# Patient Record
Sex: FEMALE | Race: ASIAN | NOT HISPANIC OR LATINO | URBAN - METROPOLITAN AREA
[De-identification: names, ages, dates, MRNs, and addresses within clinical notes are randomized per-mention and may not be internally consistent; named-entity substitution may affect disease eponyms.]

---

## 2017-10-14 ENCOUNTER — EMERGENCY (EMERGENCY)
Facility: HOSPITAL | Age: 82
LOS: 0 days | Discharge: HOME | End: 2017-10-14

## 2017-10-14 DIAGNOSIS — S52.601A UNSPECIFIED FRACTURE OF LOWER END OF RIGHT ULNA, INITIAL ENCOUNTER FOR CLOSED FRACTURE: ICD-10-CM

## 2017-10-14 DIAGNOSIS — S09.90XA UNSPECIFIED INJURY OF HEAD, INITIAL ENCOUNTER: ICD-10-CM

## 2017-10-14 DIAGNOSIS — I10 ESSENTIAL (PRIMARY) HYPERTENSION: ICD-10-CM
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
## 2017-10-14 DIAGNOSIS — E78.00 PURE HYPERCHOLESTEROLEMIA, UNSPECIFIED: ICD-10-CM

## 2023-05-23 ENCOUNTER — INPATIENT (INPATIENT)
Facility: HOSPITAL | Age: 88
LOS: 3 days | Discharge: HOME | DRG: 871 | End: 2023-05-27
Attending: FAMILY MEDICINE | Admitting: FAMILY MEDICINE
Payer: MEDICARE

## 2023-05-23 VITALS
OXYGEN SATURATION: 93 % | SYSTOLIC BLOOD PRESSURE: 157 MMHG | RESPIRATION RATE: 18 BRPM | TEMPERATURE: 101 F | HEART RATE: 106 BPM | DIASTOLIC BLOOD PRESSURE: 88 MMHG

## 2023-05-23 LAB
BASOPHILS # BLD AUTO: 0.01 K/UL — SIGNIFICANT CHANGE UP (ref 0–0.2)
BASOPHILS NFR BLD AUTO: 0.4 % — SIGNIFICANT CHANGE UP (ref 0–1)
EOSINOPHIL # BLD AUTO: 0.01 K/UL — SIGNIFICANT CHANGE UP (ref 0–0.7)
EOSINOPHIL NFR BLD AUTO: 0.4 % — SIGNIFICANT CHANGE UP (ref 0–8)
GAS PNL BLDV: SIGNIFICANT CHANGE UP
HCT VFR BLD CALC: 30.2 % — LOW (ref 37–47)
HGB BLD-MCNC: 9.7 G/DL — LOW (ref 12–16)
IMM GRANULOCYTES NFR BLD AUTO: 0.7 % — HIGH (ref 0.1–0.3)
INR BLD: 0.96 RATIO — SIGNIFICANT CHANGE UP (ref 0.65–1.3)
LYMPHOCYTES # BLD AUTO: 0.21 K/UL — LOW (ref 1.2–3.4)
LYMPHOCYTES # BLD AUTO: 7.7 % — LOW (ref 20.5–51.1)
MCHC RBC-ENTMCNC: 30.7 PG — SIGNIFICANT CHANGE UP (ref 27–31)
MCHC RBC-ENTMCNC: 32.1 G/DL — SIGNIFICANT CHANGE UP (ref 32–37)
MCV RBC AUTO: 95.6 FL — SIGNIFICANT CHANGE UP (ref 81–99)
MONOCYTES # BLD AUTO: 0.04 K/UL — LOW (ref 0.1–0.6)
MONOCYTES NFR BLD AUTO: 1.5 % — LOW (ref 1.7–9.3)
NEUTROPHILS # BLD AUTO: 2.43 K/UL — SIGNIFICANT CHANGE UP (ref 1.4–6.5)
NEUTROPHILS NFR BLD AUTO: 89.3 % — HIGH (ref 42.2–75.2)
NRBC # BLD: 0 /100 WBCS — SIGNIFICANT CHANGE UP (ref 0–0)
PLATELET # BLD AUTO: 104 K/UL — LOW (ref 130–400)
PMV BLD: 11.6 FL — HIGH (ref 7.4–10.4)
PROTHROM AB SERPL-ACNC: 10.9 SEC — SIGNIFICANT CHANGE UP (ref 9.95–12.87)
RBC # BLD: 3.16 M/UL — LOW (ref 4.2–5.4)
RBC # FLD: 13.2 % — SIGNIFICANT CHANGE UP (ref 11.5–14.5)
WBC # BLD: 2.72 K/UL — LOW (ref 4.8–10.8)
WBC # FLD AUTO: 2.72 K/UL — LOW (ref 4.8–10.8)

## 2023-05-23 PROCEDURE — 71045 X-RAY EXAM CHEST 1 VIEW: CPT | Mod: 26

## 2023-05-23 PROCEDURE — 93010 ELECTROCARDIOGRAM REPORT: CPT

## 2023-05-23 PROCEDURE — 99285 EMERGENCY DEPT VISIT HI MDM: CPT

## 2023-05-23 RX ORDER — SODIUM CHLORIDE 9 MG/ML
1000 INJECTION INTRAMUSCULAR; INTRAVENOUS; SUBCUTANEOUS ONCE
Refills: 0 | Status: COMPLETED | OUTPATIENT
Start: 2023-05-23 | End: 2023-05-23

## 2023-05-23 RX ORDER — ACETAMINOPHEN 500 MG
975 TABLET ORAL ONCE
Refills: 0 | Status: COMPLETED | OUTPATIENT
Start: 2023-05-23 | End: 2023-05-23

## 2023-05-23 RX ORDER — CEFTRIAXONE 500 MG/1
1000 INJECTION, POWDER, FOR SOLUTION INTRAMUSCULAR; INTRAVENOUS ONCE
Refills: 0 | Status: COMPLETED | OUTPATIENT
Start: 2023-05-23 | End: 2023-05-23

## 2023-05-23 RX ADMIN — CEFTRIAXONE 100 MILLIGRAM(S): 500 INJECTION, POWDER, FOR SOLUTION INTRAMUSCULAR; INTRAVENOUS at 23:39

## 2023-05-23 RX ADMIN — SODIUM CHLORIDE 1000 MILLILITER(S): 9 INJECTION INTRAMUSCULAR; INTRAVENOUS; SUBCUTANEOUS at 23:40

## 2023-05-23 RX ADMIN — Medication 975 MILLIGRAM(S): at 23:38

## 2023-05-24 DIAGNOSIS — Z96.651 PRESENCE OF RIGHT ARTIFICIAL KNEE JOINT: Chronic | ICD-10-CM

## 2023-05-24 DIAGNOSIS — Z90.49 ACQUIRED ABSENCE OF OTHER SPECIFIED PARTS OF DIGESTIVE TRACT: Chronic | ICD-10-CM

## 2023-05-24 DIAGNOSIS — A41.9 SEPSIS, UNSPECIFIED ORGANISM: ICD-10-CM

## 2023-05-24 LAB
ALBUMIN SERPL ELPH-MCNC: 2.8 G/DL — LOW (ref 3.5–5.2)
ALBUMIN SERPL ELPH-MCNC: 3.5 G/DL — SIGNIFICANT CHANGE UP (ref 3.5–5.2)
ALP SERPL-CCNC: 57 U/L — SIGNIFICANT CHANGE UP (ref 30–115)
ALP SERPL-CCNC: 76 U/L — SIGNIFICANT CHANGE UP (ref 30–115)
ALT FLD-CCNC: 17 U/L — SIGNIFICANT CHANGE UP (ref 0–41)
ALT FLD-CCNC: 22 U/L — SIGNIFICANT CHANGE UP (ref 0–41)
ANION GAP SERPL CALC-SCNC: 11 MMOL/L — SIGNIFICANT CHANGE UP (ref 7–14)
ANION GAP SERPL CALC-SCNC: 14 MMOL/L — SIGNIFICANT CHANGE UP (ref 7–14)
APPEARANCE UR: CLEAR — SIGNIFICANT CHANGE UP
APTT BLD: 23.2 SEC — CRITICAL LOW (ref 27–39.2)
AST SERPL-CCNC: 26 U/L — SIGNIFICANT CHANGE UP (ref 0–41)
AST SERPL-CCNC: 37 U/L — SIGNIFICANT CHANGE UP (ref 0–41)
BACTERIA # UR AUTO: SIGNIFICANT CHANGE UP /HPF
BASE EXCESS BLDV CALC-SCNC: 0.1 MMOL/L — SIGNIFICANT CHANGE UP (ref -2–3)
BASOPHILS # BLD AUTO: 0.03 K/UL — SIGNIFICANT CHANGE UP (ref 0–0.2)
BASOPHILS NFR BLD AUTO: 0.3 % — SIGNIFICANT CHANGE UP (ref 0–1)
BILIRUB SERPL-MCNC: 0.5 MG/DL — SIGNIFICANT CHANGE UP (ref 0.2–1.2)
BILIRUB SERPL-MCNC: 0.8 MG/DL — SIGNIFICANT CHANGE UP (ref 0.2–1.2)
BILIRUB UR-MCNC: NEGATIVE — SIGNIFICANT CHANGE UP
BLD GP AB SCN SERPL QL: SIGNIFICANT CHANGE UP
BUN SERPL-MCNC: 38 MG/DL — HIGH (ref 10–20)
BUN SERPL-MCNC: 40 MG/DL — HIGH (ref 10–20)
CA-I SERPL-SCNC: 1.04 MMOL/L — LOW (ref 1.15–1.33)
CALCIUM SERPL-MCNC: 7.1 MG/DL — LOW (ref 8.4–10.5)
CALCIUM SERPL-MCNC: 8 MG/DL — LOW (ref 8.4–10.5)
CHLORIDE SERPL-SCNC: 105 MMOL/L — SIGNIFICANT CHANGE UP (ref 98–110)
CHLORIDE SERPL-SCNC: 109 MMOL/L — SIGNIFICANT CHANGE UP (ref 98–110)
CO2 SERPL-SCNC: 22 MMOL/L — SIGNIFICANT CHANGE UP (ref 17–32)
CO2 SERPL-SCNC: 22 MMOL/L — SIGNIFICANT CHANGE UP (ref 17–32)
COLOR SPEC: YELLOW — SIGNIFICANT CHANGE UP
CREAT SERPL-MCNC: 2.2 MG/DL — HIGH (ref 0.7–1.5)
CREAT SERPL-MCNC: 2.3 MG/DL — HIGH (ref 0.7–1.5)
DIFF PNL FLD: ABNORMAL
EGFR: 20 ML/MIN/1.73M2 — LOW
EGFR: 21 ML/MIN/1.73M2 — LOW
EOSINOPHIL # BLD AUTO: 0.05 K/UL — SIGNIFICANT CHANGE UP (ref 0–0.7)
EOSINOPHIL NFR BLD AUTO: 0.5 % — SIGNIFICANT CHANGE UP (ref 0–8)
EPI CELLS # UR: ABNORMAL /HPF (ref 0–5)
FLUAV AG NPH QL: SIGNIFICANT CHANGE UP
FLUBV AG NPH QL: SIGNIFICANT CHANGE UP
GAS PNL BLDV: 133 MMOL/L — LOW (ref 136–145)
GAS PNL BLDV: SIGNIFICANT CHANGE UP
GLUCOSE SERPL-MCNC: 125 MG/DL — HIGH (ref 70–99)
GLUCOSE SERPL-MCNC: 128 MG/DL — HIGH (ref 70–99)
GLUCOSE UR QL: NEGATIVE MG/DL — SIGNIFICANT CHANGE UP
GRAM STN FLD: SIGNIFICANT CHANGE UP
GRAM STN FLD: SIGNIFICANT CHANGE UP
HCO3 BLDV-SCNC: 24 MMOL/L — SIGNIFICANT CHANGE UP (ref 22–29)
HCT VFR BLD CALC: 30 % — LOW (ref 37–47)
HCT VFR BLDA CALC: 52 % — HIGH (ref 39–51)
HGB BLD CALC-MCNC: 17.4 G/DL — SIGNIFICANT CHANGE UP (ref 12.6–17.4)
HGB BLD-MCNC: 9.1 G/DL — LOW (ref 12–16)
IMM GRANULOCYTES NFR BLD AUTO: 0.6 % — HIGH (ref 0.1–0.3)
IRON SATN MFR SERPL: 10 UG/DL — LOW (ref 35–150)
IRON SATN MFR SERPL: 6 % — LOW (ref 15–50)
KETONES UR-MCNC: NEGATIVE — SIGNIFICANT CHANGE UP
LACTATE BLDV-MCNC: 1.5 MMOL/L — SIGNIFICANT CHANGE UP (ref 0.5–2)
LACTATE SERPL-SCNC: 1.7 MMOL/L — SIGNIFICANT CHANGE UP (ref 0.7–2)
LEUKOCYTE ESTERASE UR-ACNC: ABNORMAL
LYMPHOCYTES # BLD AUTO: 0.71 K/UL — LOW (ref 1.2–3.4)
LYMPHOCYTES # BLD AUTO: 7.4 % — LOW (ref 20.5–51.1)
MAGNESIUM SERPL-MCNC: 1.5 MG/DL — LOW (ref 1.8–2.4)
MCHC RBC-ENTMCNC: 30.3 G/DL — LOW (ref 32–37)
MCHC RBC-ENTMCNC: 30.5 PG — SIGNIFICANT CHANGE UP (ref 27–31)
MCV RBC AUTO: 100.7 FL — HIGH (ref 81–99)
MONOCYTES # BLD AUTO: 0.53 K/UL — SIGNIFICANT CHANGE UP (ref 0.1–0.6)
MONOCYTES NFR BLD AUTO: 5.5 % — SIGNIFICANT CHANGE UP (ref 1.7–9.3)
NEUTROPHILS # BLD AUTO: 8.21 K/UL — HIGH (ref 1.4–6.5)
NEUTROPHILS NFR BLD AUTO: 85.7 % — HIGH (ref 42.2–75.2)
NITRITE UR-MCNC: NEGATIVE — SIGNIFICANT CHANGE UP
NRBC # BLD: 0 /100 WBCS — SIGNIFICANT CHANGE UP (ref 0–0)
PCO2 BLDV: 36 MMHG — LOW (ref 39–42)
PH BLDV: 7.43 — SIGNIFICANT CHANGE UP (ref 7.32–7.43)
PH UR: 6.5 — SIGNIFICANT CHANGE UP (ref 5–8)
PHOSPHATE SERPL-MCNC: 2.3 MG/DL — SIGNIFICANT CHANGE UP (ref 2.1–4.9)
PLATELET # BLD AUTO: 96 K/UL — LOW (ref 130–400)
PMV BLD: 10.8 FL — HIGH (ref 7.4–10.4)
PO2 BLDV: 53 MMHG — SIGNIFICANT CHANGE UP
POTASSIUM BLDV-SCNC: 3.5 MMOL/L — SIGNIFICANT CHANGE UP (ref 3.5–5.1)
POTASSIUM SERPL-MCNC: 3.6 MMOL/L — SIGNIFICANT CHANGE UP (ref 3.5–5)
POTASSIUM SERPL-MCNC: 3.9 MMOL/L — SIGNIFICANT CHANGE UP (ref 3.5–5)
POTASSIUM SERPL-SCNC: 3.6 MMOL/L — SIGNIFICANT CHANGE UP (ref 3.5–5)
POTASSIUM SERPL-SCNC: 3.9 MMOL/L — SIGNIFICANT CHANGE UP (ref 3.5–5)
PROCALCITONIN SERPL-MCNC: 19 NG/ML — HIGH (ref 0.02–0.1)
PROT SERPL-MCNC: 4.9 G/DL — LOW (ref 6–8)
PROT SERPL-MCNC: 5.7 G/DL — LOW (ref 6–8)
PROT UR-MCNC: >=300 MG/DL
RBC # BLD: 2.98 M/UL — LOW (ref 4.2–5.4)
RBC # FLD: 13.4 % — SIGNIFICANT CHANGE UP (ref 11.5–14.5)
RBC CASTS # UR COMP ASSIST: SIGNIFICANT CHANGE UP /HPF (ref 0–4)
RSV RNA NPH QL NAA+NON-PROBE: SIGNIFICANT CHANGE UP
SAO2 % BLDV: 89.2 % — SIGNIFICANT CHANGE UP
SARS-COV-2 RNA SPEC QL NAA+PROBE: SIGNIFICANT CHANGE UP
SODIUM SERPL-SCNC: 141 MMOL/L — SIGNIFICANT CHANGE UP (ref 135–146)
SODIUM SERPL-SCNC: 142 MMOL/L — SIGNIFICANT CHANGE UP (ref 135–146)
SP GR SPEC: 1.02 — SIGNIFICANT CHANGE UP (ref 1.01–1.03)
SPECIMEN SOURCE: SIGNIFICANT CHANGE UP
SPECIMEN SOURCE: SIGNIFICANT CHANGE UP
TIBC SERPL-MCNC: 156 UG/DL — LOW (ref 220–430)
TRANSFERRIN SERPL-MCNC: 139 MG/DL — LOW (ref 200–360)
TROPONIN T SERPL-MCNC: 0.09 NG/ML — CRITICAL HIGH
TROPONIN T SERPL-MCNC: 0.11 NG/ML — CRITICAL HIGH
UIBC SERPL-MCNC: 146 UG/DL — SIGNIFICANT CHANGE UP (ref 110–370)
UROBILINOGEN FLD QL: 0.2 MG/DL — SIGNIFICANT CHANGE UP
WBC # BLD: 9.59 K/UL — SIGNIFICANT CHANGE UP (ref 4.8–10.8)
WBC # FLD AUTO: 9.59 K/UL — SIGNIFICANT CHANGE UP (ref 4.8–10.8)
WBC UR QL: >50 /HPF (ref 0–5)

## 2023-05-24 PROCEDURE — 86901 BLOOD TYPING SEROLOGIC RH(D): CPT

## 2023-05-24 PROCEDURE — 86850 RBC ANTIBODY SCREEN: CPT

## 2023-05-24 PROCEDURE — 99233 SBSQ HOSP IP/OBS HIGH 50: CPT

## 2023-05-24 PROCEDURE — 85045 AUTOMATED RETICULOCYTE COUNT: CPT

## 2023-05-24 PROCEDURE — 83550 IRON BINDING TEST: CPT

## 2023-05-24 PROCEDURE — 84100 ASSAY OF PHOSPHORUS: CPT

## 2023-05-24 PROCEDURE — 97116 GAIT TRAINING THERAPY: CPT | Mod: GP

## 2023-05-24 PROCEDURE — 85025 COMPLETE CBC W/AUTO DIFF WBC: CPT

## 2023-05-24 PROCEDURE — 80048 BASIC METABOLIC PNL TOTAL CA: CPT

## 2023-05-24 PROCEDURE — 97162 PT EVAL MOD COMPLEX 30 MIN: CPT | Mod: GP

## 2023-05-24 PROCEDURE — 82746 ASSAY OF FOLIC ACID SERUM: CPT

## 2023-05-24 PROCEDURE — 82553 CREATINE MB FRACTION: CPT

## 2023-05-24 PROCEDURE — 99222 1ST HOSP IP/OBS MODERATE 55: CPT

## 2023-05-24 PROCEDURE — 80053 COMPREHEN METABOLIC PANEL: CPT

## 2023-05-24 PROCEDURE — 83935 ASSAY OF URINE OSMOLALITY: CPT

## 2023-05-24 PROCEDURE — 74176 CT ABD & PELVIS W/O CONTRAST: CPT | Mod: 26,MA

## 2023-05-24 PROCEDURE — 93306 TTE W/DOPPLER COMPLETE: CPT | Mod: 26

## 2023-05-24 PROCEDURE — 84540 ASSAY OF URINE/UREA-N: CPT

## 2023-05-24 PROCEDURE — 36415 COLL VENOUS BLD VENIPUNCTURE: CPT

## 2023-05-24 PROCEDURE — 83735 ASSAY OF MAGNESIUM: CPT

## 2023-05-24 PROCEDURE — 84145 PROCALCITONIN (PCT): CPT

## 2023-05-24 PROCEDURE — 82607 VITAMIN B-12: CPT

## 2023-05-24 PROCEDURE — 84484 ASSAY OF TROPONIN QUANT: CPT

## 2023-05-24 PROCEDURE — 85027 COMPLETE CBC AUTOMATED: CPT

## 2023-05-24 PROCEDURE — 93970 EXTREMITY STUDY: CPT

## 2023-05-24 PROCEDURE — 82570 ASSAY OF URINE CREATININE: CPT

## 2023-05-24 PROCEDURE — 87040 BLOOD CULTURE FOR BACTERIA: CPT

## 2023-05-24 PROCEDURE — 93306 TTE W/DOPPLER COMPLETE: CPT

## 2023-05-24 PROCEDURE — 86900 BLOOD TYPING SEROLOGIC ABO: CPT

## 2023-05-24 PROCEDURE — 82728 ASSAY OF FERRITIN: CPT

## 2023-05-24 PROCEDURE — 93005 ELECTROCARDIOGRAM TRACING: CPT

## 2023-05-24 PROCEDURE — 93010 ELECTROCARDIOGRAM REPORT: CPT

## 2023-05-24 PROCEDURE — 83036 HEMOGLOBIN GLYCOSYLATED A1C: CPT

## 2023-05-24 PROCEDURE — 84466 ASSAY OF TRANSFERRIN: CPT

## 2023-05-24 PROCEDURE — 83540 ASSAY OF IRON: CPT

## 2023-05-24 PROCEDURE — 84300 ASSAY OF URINE SODIUM: CPT

## 2023-05-24 PROCEDURE — 97110 THERAPEUTIC EXERCISES: CPT | Mod: GP

## 2023-05-24 RX ORDER — HEPARIN SODIUM 5000 [USP'U]/ML
7500 INJECTION INTRAVENOUS; SUBCUTANEOUS EVERY 8 HOURS
Refills: 0 | Status: DISCONTINUED | OUTPATIENT
Start: 2023-05-24 | End: 2023-05-24

## 2023-05-24 RX ORDER — SIMVASTATIN 20 MG/1
20 TABLET, FILM COATED ORAL AT BEDTIME
Refills: 0 | Status: DISCONTINUED | OUTPATIENT
Start: 2023-05-24 | End: 2023-05-27

## 2023-05-24 RX ORDER — ACETAMINOPHEN 500 MG
975 TABLET ORAL EVERY 6 HOURS
Refills: 0 | Status: DISCONTINUED | OUTPATIENT
Start: 2023-05-24 | End: 2023-05-27

## 2023-05-24 RX ORDER — HEPARIN SODIUM 5000 [USP'U]/ML
5000 INJECTION INTRAVENOUS; SUBCUTANEOUS EVERY 8 HOURS
Refills: 0 | Status: DISCONTINUED | OUTPATIENT
Start: 2023-05-24 | End: 2023-05-27

## 2023-05-24 RX ORDER — AMLODIPINE BESYLATE 2.5 MG/1
1 TABLET ORAL
Refills: 0 | DISCHARGE

## 2023-05-24 RX ORDER — TRAZODONE HCL 50 MG
1 TABLET ORAL
Refills: 0 | DISCHARGE

## 2023-05-24 RX ORDER — CEFTRIAXONE 500 MG/1
1000 INJECTION, POWDER, FOR SOLUTION INTRAMUSCULAR; INTRAVENOUS EVERY 24 HOURS
Refills: 0 | Status: DISCONTINUED | OUTPATIENT
Start: 2023-05-24 | End: 2023-05-25

## 2023-05-24 RX ORDER — MAGNESIUM SULFATE 500 MG/ML
2 VIAL (ML) INJECTION ONCE
Refills: 0 | Status: COMPLETED | OUTPATIENT
Start: 2023-05-24 | End: 2023-05-24

## 2023-05-24 RX ORDER — LOSARTAN/HYDROCHLOROTHIAZIDE 100MG-25MG
1 TABLET ORAL
Refills: 0 | DISCHARGE

## 2023-05-24 RX ORDER — MEMANTINE HYDROCHLORIDE 10 MG/1
1 TABLET ORAL
Refills: 0 | DISCHARGE

## 2023-05-24 RX ORDER — SODIUM CHLORIDE 9 MG/ML
1000 INJECTION INTRAMUSCULAR; INTRAVENOUS; SUBCUTANEOUS ONCE
Refills: 0 | Status: COMPLETED | OUTPATIENT
Start: 2023-05-24 | End: 2023-05-24

## 2023-05-24 RX ORDER — SIMVASTATIN 20 MG/1
1 TABLET, FILM COATED ORAL
Refills: 0 | DISCHARGE

## 2023-05-24 RX ORDER — SODIUM CHLORIDE 9 MG/ML
1000 INJECTION INTRAMUSCULAR; INTRAVENOUS; SUBCUTANEOUS
Refills: 0 | Status: DISCONTINUED | OUTPATIENT
Start: 2023-05-24 | End: 2023-05-25

## 2023-05-24 RX ORDER — CHLORHEXIDINE GLUCONATE 213 G/1000ML
1 SOLUTION TOPICAL
Refills: 0 | Status: DISCONTINUED | OUTPATIENT
Start: 2023-05-24 | End: 2023-05-27

## 2023-05-24 RX ORDER — PANTOPRAZOLE SODIUM 20 MG/1
40 TABLET, DELAYED RELEASE ORAL
Refills: 0 | Status: DISCONTINUED | OUTPATIENT
Start: 2023-05-24 | End: 2023-05-27

## 2023-05-24 RX ADMIN — PANTOPRAZOLE SODIUM 40 MILLIGRAM(S): 20 TABLET, DELAYED RELEASE ORAL at 06:03

## 2023-05-24 RX ADMIN — Medication 25 GRAM(S): at 12:24

## 2023-05-24 RX ADMIN — SIMVASTATIN 20 MILLIGRAM(S): 20 TABLET, FILM COATED ORAL at 21:09

## 2023-05-24 RX ADMIN — Medication 975 MILLIGRAM(S): at 13:20

## 2023-05-24 RX ADMIN — Medication 25 GRAM(S): at 08:50

## 2023-05-24 RX ADMIN — SODIUM CHLORIDE 1000 MILLILITER(S): 9 INJECTION INTRAMUSCULAR; INTRAVENOUS; SUBCUTANEOUS at 05:30

## 2023-05-24 RX ADMIN — HEPARIN SODIUM 7500 UNIT(S): 5000 INJECTION INTRAVENOUS; SUBCUTANEOUS at 05:27

## 2023-05-24 RX ADMIN — HEPARIN SODIUM 5000 UNIT(S): 5000 INJECTION INTRAVENOUS; SUBCUTANEOUS at 21:09

## 2023-05-24 RX ADMIN — SODIUM CHLORIDE 100 MILLILITER(S): 9 INJECTION INTRAMUSCULAR; INTRAVENOUS; SUBCUTANEOUS at 05:30

## 2023-05-24 RX ADMIN — HEPARIN SODIUM 5000 UNIT(S): 5000 INJECTION INTRAVENOUS; SUBCUTANEOUS at 16:07

## 2023-05-24 RX ADMIN — SODIUM CHLORIDE 1000 MILLILITER(S): 9 INJECTION INTRAMUSCULAR; INTRAVENOUS; SUBCUTANEOUS at 03:12

## 2023-05-24 RX ADMIN — CHLORHEXIDINE GLUCONATE 1 APPLICATION(S): 213 SOLUTION TOPICAL at 05:31

## 2023-05-24 RX ADMIN — CEFTRIAXONE 100 MILLIGRAM(S): 500 INJECTION, POWDER, FOR SOLUTION INTRAMUSCULAR; INTRAVENOUS at 21:09

## 2023-05-24 RX ADMIN — Medication 975 MILLIGRAM(S): at 12:41

## 2023-05-24 NOTE — H&P ADULT - NSHPPHYSICALEXAM_GEN_ALL_CORE
GENERAL:  89y/o   Female NAD, resting comfortably. pt sleepy , arousable speaks Wolof  HEAD:  Atraumatic, Normocephalic  EYES: EOMI, PERRLA, conjunctiva and sclera clear  NECK: Supple, No JVD, no cervical lymphadenopathy, non-tender  CHEST/LUNG: Clear to auscultation bilaterally; No wheeze, rhonchi, or rales  HEART: Regular rate and rhythm; S1&S2  ABDOMEN: Soft, Nontender, Nondistended x 4 quadrants; Bowel sounds present  EXTREMITIES:   Peripheral Pulses Present, No clubbing, no cyanosis, or no edema, no calf tenderness  PSYCH: AAOx3, cooperative, appropriate  NEUROLOGY: WNL  SKIN: WNL

## 2023-05-24 NOTE — ED PROVIDER NOTE - PATIENT/CAREGIVER OFFERED  INTERPRETER SERVICES
Pt. discharged home with wife. Pt. verbalized understanding of all f/u visits and new medications. IV removed. Pt. issued a walker by PT.    yes

## 2023-05-24 NOTE — CONSULT NOTE ADULT - ASSESSMENT
IMPRESSION:    Septic on admission  UTI   Colon cancer s/p resection - on oral chemo  Pancytopenia  KJ  vs CKD    PLAN:    CNS: Avoid sedation     HEENT: Oral care    PULMONARY:  HOB @ 45 degrees.  Aspiration precautions. on room air.      CARDIOVASCULAR: DC IVF. CHEETAH if become hypotensive. Avoid volume overload. Strict I's and O's. ECHO. Trend CE. MAP>65.     GI: PO Feeding. Bowel regimen     RENAL: Follow up lytes. Correct as needed. No hydronephrosis. Urine lytes.   Nephro follow up    INFECTIOUS DISEASE: Follow up blood and urine cultures. C/w Rocephin for UTI    HEMATOLOGICAL:  DVT prophylaxis. Follow up oncologist for pancytopenia, iron studies.    ENDOCRINE:  Follow up FS.  Insulin protocol if needed.    MUSCULOSKELETAL: IAT. PT / rehab    SDU     Trops, Echo, f/u Cx, Kj and pancytopenia.

## 2023-05-24 NOTE — CONSULT NOTE ADULT - SUBJECTIVE AND OBJECTIVE BOX
Patient is a 88y old  Female who presents with a chief complaint of sepsis (24 May 2023 02:50)      HPI:  87yo  female w/ PMH as noted below.  Pt comes from home c/o progressive fever, malsise.  In ER pt was found to have fever 103F hypotensive (SBP 85).  Pt was given 2 liters NS IVF w/o much improvement.  daughter denied: nausea, vomiting ,diarrhea.  W/U in ER did not show any obvious source for, pneumonia  some WBC in urine . possible early  UTI.  Per daughter pt has no advanced directives & remains full code. w/u (24 May 2023 02:50)      PAST MEDICAL & SURGICAL HISTORY:  Colon cancer      HTN (hypertension)      Dyslipidemia      S/P colon resection      Status post right knee replacement      SOCIAL HX:   Smoking                         ETOH                            Other    FAMILY HISTORY:  :  No known cardiovacular family hisotry     Review Of Systems:     All ROS are negative except per HPI       Allergies    No Known Allergies    Intolerances          PHYSICAL EXAM    ICU Vital Signs Last 24 Hrs  T(C): 35.3 (24 May 2023 07:05), Max: 40.3 (23 May 2023 23:36)  T(F): 95.6 (24 May 2023 07:05), Max: 104.5 (23 May 2023 23:36)  HR: 56 (24 May 2023 07:30) (56 - 106)  BP: 107/55 (24 May 2023 07:00) (86/50 - 157/88)  BP(mean): 77 (24 May 2023 07:00) (66 - 77)  ABP: --  ABP(mean): --  RR: 19 (24 May 2023 07:30) (18 - 25)  SpO2: 96% (24 May 2023 07:30) (93% - 99%)    O2 Parameters below as of 24 May 2023 07:30  Patient On (Oxygen Delivery Method): room air              ENT: Airway patent,  EYES: Pupils equal, Round and reactive to light.  CARDIAC: Normal rate, Regular rhythm.    RESPIRATORY: No wheezing, Bilateral BS, Not tachypneic, No use of accessory muscles  GASTROINTESTINAL: Abdomen soft, Non-tender, No guarding,   NEUROLOGICAL: Alert and oriented   SKIN: Skin normal color for race, Warm and dry and intact.       23 @ 07:01  -  23 @ 07:00  --------------------------------------------------------  IN:    sodium chloride 0.9%: 400 mL    Sodium Chloride 0.9% Bolus: 2000 mL  Total IN: 2400 mL    OUT:  Total OUT: 0 mL    Total NET: 2400 mL          LABS:                          9.7    2.72  )-----------( 104      ( 23 May 2023 23:35 )             30.2                                               05-24    142  |  109  |  38<H>  ----------------------------<  125<H>  3.6   |  22  |  2.3<H>    Ca    7.1<L>      24 May 2023 05:24  Phos  2.3     05-  Mg     1.5         TPro  4.9<L>  /  Alb  2.8<L>  /  TBili  0.5  /  DBili  x   /  AST  26  /  ALT  17  /  AlkPhos  57  05-24      PT/INR - ( 23 May 2023 23:35 )   PT: 10.90 sec;   INR: 0.96 ratio         PTT - ( 23 May 2023 23:35 )  PTT:23.2 sec                                       Urinalysis Basic - ( 24 May 2023 00:00 )    Color: Yellow / Appearance: Clear / S.020 / pH: x  Gluc: x / Ketone: Negative  / Bili: Negative / Urobili: 0.2 mg/dL   Blood: x / Protein: >=300 mg/dL / Nitrite: Negative   Leuk Esterase: Trace / RBC: 1-2 /HPF / WBC >50 /HPF   Sq Epi: x / Non Sq Epi: x / Bacteria: Occasional /HPF        CARDIAC MARKERS ( 23 May 2023 23:35 )  x     / 0.09 ng/mL / x     / x     / x                                                LIVER FUNCTIONS - ( 24 May 2023 05:24 )  Alb: 2.8 g/dL / Pro: 4.9 g/dL / ALK PHOS: 57 U/L / ALT: 17 U/L / AST: 26 U/L / GGT: x                                                                                                                                         MEDICATIONS  (STANDING):  chlorhexidine 2% Cloths 1 Application(s) Topical <User Schedule>  heparin   Injectable 5000 Unit(s) SubCutaneous every 8 hours  magnesium sulfate  IVPB 2 Gram(s) IV Intermittent once  magnesium sulfate  IVPB 2 Gram(s) IV Intermittent once  pantoprazole    Tablet 40 milliGRAM(s) Oral before breakfast  sodium chloride 0.9%. 1000 milliLiter(s) (100 mL/Hr) IV Continuous <Continuous>    MEDICATIONS  (PRN):

## 2023-05-24 NOTE — ED PROVIDER NOTE - OBJECTIVE STATEMENT
88 year old female past medical history of Hypertension, HLD and colon cancer with resection and on oral chemotherapy comes to emergency room for body aches with fever chills, back/abdominal pain and leg pain. patient also having some coughing at shortness of breath.

## 2023-05-24 NOTE — CONSULT NOTE ADULT - SUBJECTIVE AND OBJECTIVE BOX
Patient is a 88y old  Female who presents with a chief complaint of sepsis (24 May 2023 09:04)        REVIEW OF SYSTEMS: Total of twelve systems have been reviewed \ and found to be negative unless mentioned in HPI        PAST MEDICAL & SURGICAL HISTORY:  Colon cancer  HTN (hypertension)  Dyslipidemia  S/P colon resection  Status post right knee replacement        SOCIAL HISTORY  Alcohol: Does not drink  Tobacco: Does not smoke  Illicit substance use: None      FAMILY HISTORY: Non contributory to the present illness        ALLERGIES: No Known Allergies      ICU Vital Signs Last 24 Hrs  T(C): 36.4 (24 May 2023 11:00), Max: 40.3 (23 May 2023 23:36)  T(F): 97.6 (24 May 2023 11:00), Max: 104.5 (23 May 2023 23:36)  HR: 56 (24 May 2023 07:30) (56 - 106)  BP: 107/55 (24 May 2023 07:00) (86/50 - 157/88)  BP(mean): 77 (24 May 2023 07:00) (66 - 77)  ABP: --  ABP(mean): --  RR: 18 (24 May 2023 13:00) (18 - 26)  SpO2: 96% (24 May 2023 07:30) (93% - 99%)    O2 Parameters below as of 24 May 2023 13:00  Patient On (Oxygen Delivery Method): room air        PHYSICAL EXAM:  GENERAL: Not in distress   CHEST/LUNG:  Not using accessory msucles  HEART: s1 and s2 present  ABDOMEN:  Nontender and  Nondistended  EXTREMITIES: No pedal  edema  CNS: Awake and Alert      LABS:                        9.1    9.59  )-----------( 96       ( 24 May 2023 11:30 )             30.0       05-24    142  |  109  |  38<H>  ----------------------------<  125<H>  3.6   |  22  |  2.3<H>    Ca    7.1<L>      24 May 2023 05:24  Phos  2.3     05-24  Mg     1.5     05-24    TPro  4.9<L>  /  Alb  2.8<L>  /  TBili  0.5  /  DBili  x   /  AST  26  /  ALT  17  /  AlkPhos  57  05-24    PT/INR - ( 23 May 2023 23:35 )   PT: 10.90 sec;   INR: 0.96 ratio      PTT - ( 23 May 2023 23:35 )  PTT:23.2 sec      Urinalysis Basic - ( 24 May 2023 00:00 )\  Color: Yellow / Appearance: Clear / S.020 / pH: x  Gluc: x / Ketone: Negative  / Bili: Negative / Urobili: 0.2 mg/dL   Blood: x / Protein: >=300 mg/dL / Nitrite: Negative   Leuk Esterase: Trace / RBC: 1-2 /HPF / WBC >50 /HPF   Sq Epi: x / Non Sq Epi: x / Bacteria: Occasional /HPF        MEDICATIONS  (STANDING):  cefTRIAXone   IVPB 1000 milliGRAM(s) IV Intermittent every 24 hours  chlorhexidine 2% Cloths 1 Application(s) Topical <User Schedule>  heparin   Injectable 5000 Unit(s) SubCutaneous every 8 hours  pantoprazole    Tablet 40 milliGRAM(s) Oral before breakfast  sodium chloride 0.9%. 1000 milliLiter(s) (100 mL/Hr) IV Continuous <Continuous>    MEDICATIONS  (PRN):  acetaminophen     Tablet .. 975 milliGRAM(s) Oral every 6 hours PRN Mild Pain (1 - 3)        RADIOLOGY & ADDITIONAL TESTS:    23: CT Abdomen and Pelvis No Cont (23 @ 01:20) No evidence of acute abdominal pathology.      23: Xray Chest 1 View-PORTABLE IMMEDIATE (23 @ 23:16) No radiographic evidence of acute cardiopulmonary disease.        MICROBIOLOGY DATA:    Flu With COVID-19 By BIBI (23 @ 23:35)   SARS-CoV-2 Result: NotDetec  Influenza A Result: NotDete  Influenza B Result: NotDetec  Resp Syn Virus Result: NotDetec             Patient is a 88y old  Female with PMH of Colon cancer, S/P colon resection, HTN, HLD, Status post right knee replacement, now presents to the ER for evaluation of fever, and malaise. On admission, she found to have fever, tachycardia, Hypotension, BP of 86/50  and positive Urine analysis.  She has given 2 liters NS IVF w/o much improvement.  She has admitted in ICU, started on Ceftriaxone and the ID consult requested to assist with further evaluation and antibiotic management.      REVIEW OF SYSTEMS: Total of twelve systems have been reviewed and found to be negative unless mentioned in HPI        PAST MEDICAL & SURGICAL HISTORY:  Colon cancer  HTN (hypertension)  Dyslipidemia  S/P colon resection  Status post right knee replacement        SOCIAL HISTORY  Alcohol: Does not drink  Tobacco: Does not smoke  Illicit substance use: None      FAMILY HISTORY: Non contributory to the present illness      ALLERGIES: No Known Allergies      ICU Vital Signs Last 24 Hrs  T(C): 36.4 (24 May 2023 11:00), Max: 40.3 (23 May 2023 23:36)  T(F): 97.6 (24 May 2023 11:00), Max: 104.5 (23 May 2023 23:36)  HR: 56 (24 May 2023 07:30) (56 - 106)  BP: 107/55 (24 May 2023 07:00) (86/50 - 157/88)  BP(mean): 77 (24 May 2023 07:00) (66 - 77)  ABP: --  ABP(mean): --  RR: 18 (24 May 2023 13:00) (18 - 26)  SpO2: 96% (24 May 2023 07:30) (93% - 99%)    O2 Parameters below as of 24 May 2023 13:00  Patient On (Oxygen Delivery Method): room air        PHYSICAL EXAM:  GENERAL: Not in distress   CHEST/LUNG:  Not using accessory msucles  HEART: s1 and s2 present  ABDOMEN:  Nontender and  Nondistended  EXTREMITIES: No pedal  edema  CNS: Awake and Alert      LABS:                        9.1    9.59  )-----------( 96       ( 24 May 2023 11:30 )             30.0       05-    142  |  109  |  38<H>  ----------------------------<  125<H>  3.6   |  22  |  2.3<H>    Ca    7.1<L>      24 May 2023 05:24  Phos  2.3     05-24  Mg     1.5     05-24    TPro  4.9<L>  /  Alb  2.8<L>  /  TBili  0.5  /  DBili  x   /  AST  26  /  ALT  17  /  AlkPhos  57  05-24    PT/INR - ( 23 May 2023 23:35 )   PT: 10.90 sec;   INR: 0.96 ratio      PTT - ( 23 May 2023 23:35 )  PTT:23.2 sec      Urinalysis Basic - ( 24 May 2023 00:00 )\  Color: Yellow / Appearance: Clear / S.020 / pH: x  Gluc: x / Ketone: Negative  / Bili: Negative / Urobili: 0.2 mg/dL   Blood: x / Protein: >=300 mg/dL / Nitrite: Negative   Leuk Esterase: Trace / RBC: 1-2 /HPF / WBC >50 /HPF   Sq Epi: x / Non Sq Epi: x / Bacteria: Occasional /HPF        MEDICATIONS  (STANDING):  cefTRIAXone   IVPB 1000 milliGRAM(s) IV Intermittent every 24 hours  chlorhexidine 2% Cloths 1 Application(s) Topical <User Schedule>  heparin   Injectable 5000 Unit(s) SubCutaneous every 8 hours  pantoprazole    Tablet 40 milliGRAM(s) Oral before breakfast  sodium chloride 0.9%. 1000 milliLiter(s) (100 mL/Hr) IV Continuous <Continuous>    MEDICATIONS  (PRN):  acetaminophen     Tablet .. 975 milliGRAM(s) Oral every 6 hours PRN Mild Pain (1 - 3)        RADIOLOGY & ADDITIONAL TESTS:    23: CT Abdomen and Pelvis No Cont (23 @ 01:20) No evidence of acute abdominal pathology.      23: Xray Chest 1 View-PORTABLE IMMEDIATE (23 @ 23:16) No radiographic evidence of acute cardiopulmonary disease.        MICROBIOLOGY DATA:    Flu With COVID-19 By BIBI (23 @ 23:35)   SARS-CoV-2 Result: NotDetec  Influenza A Result: NotDetec  Influenza B Result: NotDetec  Resp Syn Virus Result: NotDetec

## 2023-05-24 NOTE — H&P ADULT - HISTORY OF PRESENT ILLNESS
87yo  female w/ PMH as noted below.  Pt comes from home c/o progressive fever, malsise.  In ER pt was found to have fever 103F hypotensive (SBP 85).  Pt was given 2 liters NS IVF w/o much improvement.  daughter denied: nausea, vomiting ,diarrhea.  W/U in ER did not show any obvious source for, pneumonia  some WBC in urine . possible early  UTI.  Per daughter pt has no advanced directives & remains full code. w/u

## 2023-05-24 NOTE — PATIENT PROFILE ADULT - FALL HARM RISK - DEVICES
Subjective   Patient seen and examined. Reports having urinary urgency, frequency, dysuria, and hematuria after stent placement. Otherwise voiding without difficulty, denies abdominal or flank pain, and no other  complaints.    REVIEW OF SYSTEMS:     Review of Systems   Constitutional: Negative for fever.   Gastrointestinal: Negative for abdominal pain and nausea.   Genitourinary: Positive for dysuria, frequency, hematuria and urgency. Negative for difficulty urinating and flank pain.        Objective     I/O's    Intake/Output Summary (Last 24 hours) at 9/19/2020 1358  Last data filed at 9/19/2020 0730  Gross per 24 hour   Intake 2419.43 ml   Output 2050 ml   Net 369.43 ml       Last Recorded Vitals  Blood pressure 139/82, pulse (!) 46, temperature 98.1 °F (36.7 °C), temperature source Oral, resp. rate 18, height 6' 3\" (1.905 m), weight 84.1 kg (185 lb 6.5 oz), SpO2 100 %.  Body mass index is 23.17 kg/m².    PE    General:  Alert and oriented x3, No acute distress.    Eye: Extraocular movements are intact.  HENT: Normocephalic, atraumatic.  Neck: Supple.  Respiratory: Non-labored respirations.  Gastrointestinal:  Soft, Non-tender, Non-distended.    Genitourinary:  No costovertebral angle tenderness.  Integumentary:  Warm, Dry.    Neurologic:  Alert, Oriented.    Musculoskeletal: No deformities.   Psychiatric:  Cooperative, Appropriate mood & affect.      Labs   WBC (K/mcL)   Date Value   09/19/2020 12.2 (H)     RBC (mil/mcL)   Date Value   09/19/2020 4.29 (L)     HCT (%)   Date Value   09/19/2020 41.3     HGB (g/dL)   Date Value   09/19/2020 13.8     PLT (K/mcL)   Date Value   09/19/2020 227      Sodium (mmol/L)   Date Value   09/19/2020 139     Potassium (mmol/L)   Date Value   09/19/2020 3.7     Chloride (mmol/L)   Date Value   09/19/2020 109 (H)     Glucose (mg/dL)   Date Value   09/19/2020 99     CALCIUM (mg/dL)   Date Value   09/19/2020 11.7 (H)     Carbon Dioxide (mmol/L)   Date Value   09/19/2020 27      BUN (mg/dL)   Date Value   09/19/2020 10     Creatinine (mg/dL)   Date Value   09/19/2020 1.19 (H)      Lab Results   Component Value Date    SPTYU URINE CLEAN CATCH 09/18/2020    COL YELLOW 09/18/2020    UAPP CLOUDY 09/18/2020    UGLU NEGATIVE 09/18/2020    UBILI NEGATIVE 09/18/2020    UKET 20 (A) 09/18/2020    USPG 1.027 09/18/2020    URBC LARGE (A) 09/18/2020    UPH 7.0 09/18/2020    UPROT NEGATIVE 09/18/2020    UROB 0.2 09/18/2020    UNITR NEGATIVE 09/18/2020    UWBC NEGATIVE 09/18/2020    SEPT NONE SEEN 09/18/2020    ERYT 6 to 10 09/18/2020    LEUK 6 to 10 09/18/2020    UBACTR LARGE (A) 09/18/2020    HCAST NONE SEEN 09/18/2020    MUCUS PRESENT 09/18/2020      Microbiology Results     None           Imaging      Assessment & Plan    Left flank pain due to obstructing left proximal ureteral stones             - CT reviewed, discussed with Dr. Muniz             - Afebrile, no hypotension. WBC 12.2 (17.2)             - Cr 1.19 (1.22)             - Urine culture pending, currently on ceftriaxone.             - s/p cystoscopy, left ureteral stent placement, and left retrograde pyelogram today. Discussed expectations for stent discomfort, all questions answered.     Nonobstructing renal stones, in the setting of hypercalcemia             - Undergoing workup for hypercalcemia. Plan to follow up for metabolic stone evaluation as outpatient.     Discussed with  attending, Dr. Muniz. Ok to discharge home from  perspective. Will call to arrange ESWL for definitive stone treatment and eventual stent removal.    JOSE E Bermudez PA-C  [facility]  Perfect Serve or          None

## 2023-05-24 NOTE — CONSULT NOTE ADULT - ATTENDING COMMENTS
Attending Statement: I have personally performed a face to face diagnostic evaluation on this patient. The patient is suffering from:  Septic on admission  UTI   Colon cancer s/p resection - on oral chemo  Pancytopenia  I have made amendments to the documentation where necessary. I have personally seen and examined this patient.  I have fully participated in the care of this patient.  I have reviewed all pertinent clinical information, including history, physical exam, plan and note.

## 2023-05-24 NOTE — CONSULT NOTE ADULT - ASSESSMENT
# Sepsis ( Fever + Tachycardia)  # UTI- WBC >50 /HPF , 5/24    would recommend:    1. Follow up Urine and Blood cultures  2. Monitor Temp. and c/w supportive care  3. Continue     will follow the patient with you and make further recommendation based on the clinical course and Lab results  Thank you for the opportunity to participate in Mr. FIGUEROA's care   Patient is a 88y old  Female with PMH of Colon cancer, S/P colon resection, HTN, HLD, Status post right knee replacement, now presents to the ER for evaluation of fever, and malaise. On admission, she found to have fever, tachycardia, Hypotension, BP of 86/50  and positive Urine analysis.  She has given 2 liters NS IVF w/o much improvement.  She has admitted in ICU, started on Ceftriaxone and the ID consult requested to assist with further evaluation and antibiotic management.    # Severe sepsis/septic shock  ( Fever + Tachycardia + Hypotension, BP of 86/50 )  # UTI- WBC >50 /HPF , 5/24    would recommend:    1. Follow up Urine and Blood cultures  2. Monitor Temp. and c/w supportive care  3. Continue Ceftriaxone until work up is done  4. Monitor kidney function and IVF    d/w ICU team    will follow the patient with you and make further recommendation based on the clinical course and Lab results  Thank you for the opportunity to participate in Ms. FIGUEROA's care    Attending Attestation:    Spent more than 65 minutes on total encounter, more than 50 % of the visit was spent counseling and/or coordinating care by the Attending physician.

## 2023-05-24 NOTE — ED PROVIDER NOTE - CLINICAL SUMMARY MEDICAL DECISION MAKING FREE TEXT BOX
87 yo F hx of colon ca sp resection, on oral chemo, HTN, HLD here for assessment of 3 days of fever, abdominal and back pain, now with severe weakness.     Patent found to be febrile to 104.5 rectally with bilateral CVA ttp. Initially normal BP but then dropped to 80s/50s despite fluids.     Labs notable for WBC of 2.97, Neutrophil % of 89. normal lactate, baseline renal insufficiency of 2.2 Cr.    UA largely positive, treated with ceftriaxone. CT without acute intraabdominal pathology, stones, abscess.    Case discussed with ICU, Dr. Palma, accepts for admission for sepsis 2/2 UTI.

## 2023-05-24 NOTE — ED PROVIDER NOTE - PHYSICAL EXAMINATION
Physical Exam    Vital Signs: I have reviewed the initial vital signs.  Constitutional: appears stated age, no acute distress  Eyes: Conjunctiva pink, Sclera clear, PERRLA, EOMI.  Cardiovascular: S1 and S2, regular rate, regular rhythm, well-perfused extremities, radial pulses equal and 2+  Respiratory: unlabored respiratory effort, clear to auscultation bilaterally no wheezing, rales and rhonchi  Gastrointestinal: soft, Generalized abd tenderness, no pulsatile mass, normal bowl sounds  Musculoskeletal: supple neck, no lower extremity edema, no midline tenderness  Integumentary: warm, dry, no rash  Neurologic: awake, alert, cranial nerves II-XII grossly intact, extremities’ motor and sensory functions grossly intact  Psychiatric: appropriate mood, appropriate affect

## 2023-05-24 NOTE — H&P ADULT - ASSESSMENT
1. Urosepsis w/ severe hypotension    2. Hx-HTN, dyslipidemia, dementia.      Plan:  Adm to ICU  IVF hydartion keep MAP @ 65  if no response after 3 rd liter do CHEETAH hemdynamics  IV pressors if not fluid responsive  s/p rocephin in ER   ID eval for proper antibx mgmt  F/U cultures & sensitivities  echo to r/o cardiogenic etiology

## 2023-05-24 NOTE — H&P ADULT - NSHPLABSRESULTS_GEN_ALL_CORE
9.7    2.72  )-----------( 104      ( 23 May 2023 23:35 )             30.2           141  |  105  |  40<H>  ----------------------------<  128<H>  3.9   |  22  |  2.2<H>    Ca    8.0<L>      23 May 2023 23:35    TPro  5.7<L>  /  Alb  3.5  /  TBili  0.8  /  DBili  x   /  AST  37  /  ALT  22  /  AlkPhos  76                Urinalysis Basic - ( 24 May 2023 00:00 )    Color: Yellow / Appearance: Clear / S.020 / pH: x  Gluc: x / Ketone: Negative  / Bili: Negative / Urobili: 0.2 mg/dL   Blood: x / Protein: >=300 mg/dL / Nitrite: Negative   Leuk Esterase: Trace / RBC: 1-2 /HPF / WBC >50 /HPF   Sq Epi: x / Non Sq Epi: x / Bacteria: Occasional /HPF        PT/INR - ( 23 May 2023 23:35 )   PT: 10.90 sec;   INR: 0.96 ratio         PTT - ( 23 May 2023 23:35 )  PTT:23.2 sec    Lactate Trend   @ 23:35 Lactate:1.7       CARDIAC MARKERS ( 23 May 2023 23:35 )  x     / 0.09 ng/mL / x     / x     / x            CAPILLARY BLOOD GLUCOSE

## 2023-05-24 NOTE — PATIENT PROFILE ADULT - FUNCTIONAL ASSESSMENT - BASIC MOBILITY ASSESSMENT TYPE
[facility]  HOSPITALIST DISCHARGE SUMMARY NOTE    ADMISSION DATE:  2/5/2023  DISCHARGE DATE:  02/08/23  DISCHARGING PHYSICIAN:  Kristel Kat MD  ATTENDING PHYSICIAN:  Kristel Kat MD    DISCHARGE DIAGNOSIS:     Principal Problem:    Altered mental status, unspecified altered mental status type  Active Problems:    Subdural hemorrhage (CMS/HCC)    Hemodialysis-associated hypotension    Debility    PEG (percutaneous endoscopic gastrostomy) status (CMS/HCC)    Sepsis (CMS/HCC)  #Acute hypoxic respiratory failure secondary to aspiration pneumonia  #Sepsis POA multifactorial due to Aspiration pneumonia POA and  MSSA bacteremia due to infected HD catheter POA  #Aspiration pneumonia POA  #metabolic/toxic encephalopathy POA  #Sepsis POA secondary to above  #Unstable atrial fibrillation with RVR   #Dislodged tunneled cath POA  #MSSA bacteremia  #Hyperkalemia  #hyponatremia  #ESRD on HD  #severe protein calorie malnutrition POA  #dementia   #pulmonary nodule        OTHER DIAGNOSES:    Past Medical History:   Diagnosis Date   • Anemia    • Chronic kidney disease    • History of renal dialysis        DISCHARGE DISPOSITION:    hospice    CONDITION AT DISCHARGE:    poor    DISCHARGE MEDICATIONS:       Summary of your Discharge Medications      ASK your doctor about these medications      Details   * midodrine 10 MG tablet  Commonly known as: PROAMATINE  Ask about: Which instructions should I use?   Take 10 mg by mouth 3 times daily as needed (for sbp< 100 d).     * midodrine 10 MG tablet  Commonly known as: PROAMATINE  Ask about: Which instructions should I use?   Take 10 mg by mouth every 8 hours as needed (SBP<100).     * midodrine 5 MG tablet  Commonly known as: PROAMATINE  Ask about: Which instructions should I use?   Take 5 mg by mouth 3 times daily. Hold if SBP>110         * This list has 3 medication(s) that are the same as other medications prescribed for you. Read the directions carefully, and ask your doctor or  other care provider to review them with you.                     HOSPITAL COURSE:    Srinivas Miller is an 86 year old male with a past medical history of SAH in 2022, ESRD on dialysis, anemia presents to the ER for hypotension and fever.  History was made from the chart review and the ER physicians notes and contacted the patient's daughter Faiza.  She states he resides at the nursing home he has had a fever and low blood pressure.  He was previously an alcoholic.  He was on dialysis for years they do not know why or the cause of his end-stage renal disease.  Daughter denies any known history of hypertension or type 2 diabetes.  Patient is awake responds to his name but he is very weak difficult to understand him.  He appears moving all 4 extremities.     He does have a left upper extremity tunneled catheter for dialysis  He has a PEG tube but apparently he has been eating over the last few months.  And has not used the PEG tube    Patient had brief episode of improvement after being started on IV antibiotics.  He did not tolerate HD initially, RRT was called first time but patient was stabilized.  In discussion with daughter, decision was made to continue with IV antibiotics and dialysis in spite of patient not being able to swallow on his own.  Speech therapist recommended strict NPO.  However, patient had RRT again in dialysis when he required high flow oxygen with significant hypoxia.  Because of high flow oxygen, patient needed to be placed in restraints so he does not remove the oxygen until family arrived.  When daughter saw patient in that condition, they did not want to continue his suffering and were ready for hospice care.  Continue morphine, Ativan as needed, Robinul.     OBJECTIVE:      Visit Vitals  BP 93/52 (BP Location: RUE - Right upper extremity, Patient Position: Semi-Carter's)   Pulse 71   Temp 96.3 °F (35.7 °C) (Tympanic)   Resp 20   Ht 5' 7\" (1.702 m)   Wt 46.1 kg (101 lb 10.1 oz)   SpO2 92%    BMI 15.92 kg/m²        Physical Exam   General:  Ill appearing, NAD, cachetic, in moderate distress  Skin:  Cool and dry without rash  Head:  Normocephalic-atraumatic, left sided central line  Eyes:  Normal conjunctiva and sclera. EOM intact  Cardiovascular: tachycardia, no murmurs, no edema  Respiratory: CTA b/l, no w/r/g  Gastrointestinal:  soft, no ttp, not distended, +peg tube  Extremities: normal appearing w/o erythema or edema  Neuro: Unable to acess      CONSULTS:    IP Consult Orders (From admission, onward)     Start     Ordered    02/06/23 1049  Inpatient consult to Palliative Care  ONE TIME        Provider:  Arpita Francis, DO    02/06/23 1048    02/06/23 1040  Inpatient consult to Nephrology  ONE TIME        Provider:  Alex Payne, DO    02/06/23 1041    02/06/23 0822  Inpatient consult to Infectious Diseases  ONE TIME        Provider:  Lianne Horne MD    02/06/23 0829                     PROCEDURES:     No surgery found      SIGNIFICANT DIAGNOSTIC STUDIES:    CT HEAD WO CONTRAST    Result Date: 2/5/2023  Narrative: EXAM: CT HEAD WO CONTRAST HISTORY: Mental status change, unknown cause Mental status change, unknown cause COMPARISON: 07/26/2022. TECHNIQUE: Contiguous helical images were obtained from the foramen magnum through the vertex without contrast. The mA and/or kV were adjusted based on the patient's size. FINDINGS: BRAIN:   No acute intracranial hemorrhage, mass effect, midline shift, or extra-axial collection.  Subcortical and periventricular white matter hypodensities compatible with chronic vessel ischemic change.  There is cerebral atrophy. VENTRICLES:   Stable ex vacuo dilatation. CISTERNS:   Normal. SKULL:   Normal. SINUSES:   Normal. ORBITS:   Normal. SOFT TISSUES:   Normal. OTHER:   None.     Impression: 1.   No acute intracranial abnormality such as hemorrhage. Electronically Signed by: FRANCISCA LOCKETT M.D. Signed on: 2/5/2023 8:15 PM     XR ABDOMEN 1 VIEW    Result Date:  2/6/2023  Narrative: EXAM: XR ABDOMEN 1 VIEW CLINICAL INDICATION: Tube placement COMPARISON: 03/29/2022     Impression: Findings/impression: Gastrostomy tube projects over the mid abdomen. Contrast is so minimal that it is difficult to tell what bowel the contrast is in. If confirmation of gastric placement is needed, recommend additional injection of contrast through the tube.  Contrast is seen in the left upper quadrant.  No bowel obstruction.  Stable focal sclerotic lesion in the right iliac wing. Electronically Signed by: NAPOLEON GANDARA M.D. Signed on: 2/6/2023 7:02 AM     XR CHEST AP OR PA    Result Date: 2/8/2023  Narrative: EXAM: XR CHEST AP OR PA HISTORY: hypoxia hypoxia COMPARISON: 2/5/2023. FINDINGS: Heart/Mediastinum: Normal. Pulmonary Vessels: Normal. Lungs: Near complete opacification of the left lung.. Pleura: No right pleural effusion and right pneumothorax. Other: Left chest central line tip projects over the right atrium..     Impression: 1.    Complete opacification of the right lung. A mucous plug is considered. Electronically Signed by: FRANCISCA LOCKETT M.D. Signed on: 2/8/2023 2:33 PM     XR CHEST PA OR AP 1 VIEW    Result Date: 2/5/2023  Narrative: EXAM: XR CHEST AP OR PA HISTORY: sepsis, hypoxia sob COMPARISON: None. FINDINGS: Heart/Mediastinum: Normal. Pulmonary Vessels: Normal. Lungs: Patchy left basilar opacities.  8 mm nodule projecting over the right lateral midlung Pleura: No pleural effusion or pneumothorax. Other: Left chest central line tip projects over the lower SVC.. Previously adjustment to balloon projects over the upper abdomen     Impression: 1.    Patchy left basilar opacities concerning for pneumonia.  Follow-up recommended. 2.    8mm nodule projecting over the right lateral midlung.  Correlate with nonemergent CT chest. Code 10 - Incidental Finding in ER case with appropriate non-emergent follow-up as described. Electronically Signed by: FRANCISCA LOCKETT M.D. Signed  on: 2023 9:06 PM     TRANSTHORACIC ECHO (TTE) COMPLETE W/ W/O IMAGING AGENT    Impression: *[facility]* [facility] [place] [address] Transthoracic Echocardiogram (TTE) Patient: Srinivas Miller     Study Date/Time:         2023 11:34AM MRN:     [id number]            FIN#:                    [id number] :     1936         Ht/Wt:                   170.2cm 46.7kg Age:     86                 BSA/BMI:                 1.47m^2 16.1kg/m^2 Gender:  M                  Baseline BP:             97 / 74 Ordering Physician:   Alejandra Salazar  Referring Physician:  Alejandra Salazar Brian  Attending Physician:  Kristel Kat Performing Physician: Madeline Lemus MD Diagnostic Physician: Madeline Lemus MD Sonographer:          Davin Palmer RDCS, SHANTELLE-PE  Fellow:               Tabitha Pérez MD  ------------------------------------------------------------------------------ INDICATIONS:   Endocarditis. ------------------------------------------------------------------------------ STUDY CONCLUSIONS SUMMARY: 1. Left ventricle: The cavity size is normal. Wall thickness is normal.    Systolic function is normal. The ejection fraction was measured by biplane    method of disks. Although no diagnostic regional wall motion abnormality is    identified, this possibility cannot be completely excluded on the basis of    this study. The ejection fraction is 65%. 2. Aortic valve: Not well visualized. The annulus is mildly calcified. Unable    to determine morphology. The leaflets are mildly calcified. Velocity is    within the normal range. There is no stenosis. There is no regurgitation. 3. Mitral valve: Not well visualized. The annulus is moderately calcified. The    leaflets are mildly calcified. Inflow velocity is within the normal range.    There is no evidence for stenosis. There is trivial regurgitation. 4. Left atrium: The atrium is normal in size. 5. Right ventricle:  The cavity size is normal. Systolic function is normal.    The RV pressure during systole is 14mm Hg. 6. Right atrium: The atrium is normal in size. 7. Tricuspid valve: There is no evidence of a vegetation. Inflow velocity is    within the normal range. There is trivial regurgitation. 8. Pulmonic valve: The valve is structurally normal. The leaflets are normal    thickness. Velocity is within the normal range. There is mild    regurgitation. 9. Pericardium, extracardiac: There is no pericardial effusion. RECOMMENDATIONS:   If clinically warranted, consider transesophageal echocardiography. ------------------------------------------------------------------------------ STUDY DATA:  There is no prior study available for comparison at this time. Procedure:  A transthoracic echocardiogram was performed. Image quality was suboptimal.  M-mode, complete 2D, complete spectral Doppler, and color Doppler.  Study status:  Routine.  Study completion:  There were no complications. FINDINGS LEFT VENTRICLE:  The cavity size is normal. Wall thickness is normal. Systolic function is normal. Although no diagnostic regional wall motion abnormality is identified, this possibility cannot be completely excluded on the basis of this study.    The ejection fraction was measured by biplane method of disks. The ejection fraction is 65%. The tissue Doppler parameters are abnormal. Left ventricular diastolic function parameters are normal. AORTIC VALVE:  Not well visualized. The annulus is mildly calcified. Unable to determine morphology. The leaflets are mildly calcified. Cusp separation is normal. Velocity is within the normal range. There is no stenosis. There is no regurgitation. The mean systolic gradient is 4mm Hg. The peak systolic gradient is 6mm Hg. The LVOT to aortic valve VTI ratio is 0.51. The valve area is 1.8cm^2. The valve area index is 1.2cm^2/m^2. The ratio of LVOT to aortic valve peak velocity is 0.62. The valve area is  2.1cm^2. The valve area index is 1.45cm^2/m^2. AORTA: Aortic root: The root is normal-sized. MITRAL VALVE:  Not well visualized. The annulus is moderately calcified. The leaflets are mildly calcified. Leaflet separation is normal. Inflow velocity is within the normal range. There is no evidence for stenosis. There is trivial regurgitation. LEFT ATRIUM:  The atrium is normal in size. RIGHT VENTRICLE:  The cavity size is normal. Systolic function is normal.  The RV pressure during systole is 14mm Hg. PULMONIC VALVE:   Not well visualized. The valve is structurally normal. The leaflets are normal thickness. Cusp separation is normal. Velocity is within the normal range. There is mild regurgitation. TRICUSPID VALVE:  Not well visualized. The leaflets are normal thickness. Leaflet separation is normal.  There is no evidence of a vegetation. Inflow velocity is within the normal range. There is trivial regurgitation. RIGHT ATRIUM:  The atrium is normal in size. PERICARDIUM:   There is no pericardial effusion. SYSTEMIC VEINS: Inferior vena cava: The IVC is dilated. ------------------------------------------------------------------------------ Measurements  Left ventricle            Value        Ref      Left atrium continued     Value          Ref  NOMAN major ax, A4C         6.7   cm     -------  Area/bsa ES, A4C          6.94  cm^2/m^2 -------  ESD major ax, A4C         6.1   cm     -------  Vol, ES, 1-p A4C      (N) 19    ml       18 - 58  FS major axis, A4C        8     %      -------  Vol/bsa, ES, 1-p A4C  (N) 13    ml/m^2   12 - 37  NOMAN/bsa major ax, A4C     4.5   cm/m^2 -------  ESD/bsa major ax, A4C     4.2   cm/m^2 -------  Aortic valve              Value          Ref  STEPHANY, A4C                  17.1  cm^2   -------  Peak v, S                 1.3   m/sec    -------  GEE, A4C                  9.7   cm^2   -------  Mean v, S                 0.96  m/sec    -------  FAC, A4C                  43    %      -------  Mean  grad, S              4     mm Hg    -------  EF                    (N) 65    %      52 - 72  Peak grad, S              6     mm Hg    -------  SV                        52    ml     -------  LVOT/AV, VTI ratio        0.51           -------  SV/bsa                    35    ml/m^2 -------  SHARMIN, VTI                  1.8   cm^2     -------  SV, 1-p A4C               22    ml     -------  SHARMIN/bsa, VTI              1.2   cm^2/m^2 -------  SV/bsa, 1-p A4C           15    ml/m^2 -------  LVOT/AV, Vpeak ratio      0.62           -------  ESV, 2-p              (L) 9     ml     21 - 61  SHARMIN, Vmax                 2.1   cm^2     -------  ESV/bsa, 2-p          (L) 6     ml/m^2 11 - 31  SHARMIN/bsa, Vmax             1.45  cm^2/m^2 -------  E', lat sandee, TDI      (L) 9.3   cm/sec >=10.0  E/e', lat sandee, TDI    (N) 6            <=13     Mitral valve              Value          Ref  E', med sandee, TDI      (L) 6.5   cm/sec >=7.0    Peak E                    0.59  m/sec    -------  E/e', med sandee, TDI        9            -------  Peak A                    0.43  m/sec    -------  E', avg, TDI              7.89  cm/sec -------  Decel time                211   ms       -------  E/e', avg, TDI        (N) 7            <=14     Peak E/A ratio            1.4            -------   LVOT                      Value        Ref      Pulmonic valve            Value          Ref  Diam, S                   2.1   cm     -------  MS v, ED                  0.83  m/sec    -------  Area                      3.5   cm^2   -------  Peak andrew, S               0.78  m/sec  -------  Tricuspid valve           Value          Ref  Peak grad, S              2     mm Hg  -------  TR peak v             (N) 1.7   m/sec    <=2.8                                                  Peak RV-RA grad, S        11    mm Hg    -------  Right ventricle           Value        Ref  Pressure, S               14    mm Hg  -------  Pulmonary artery          Value          Ref                                                   Pressure, S               14    mm Hg    -------  Left atrium               Value        Ref      Pressure ED               6     mm Hg    -------  Area ES, A4C          (N) 10    cm^2   <=20 Legend: (L)  and  (H)  gabriella values outside specified reference range. (N)  marks values inside specified reference range. Prepared and electronically signed by Madeline Lemus MD 02/07/2023 16:40 Prior Signatures: Preliminary Reviewed by Tabitha Pérez MD - 2/7/2023 4:33:07 PM    CTA CHEST PULMONARY EMBOLISM, CT ABDOMEN PELVIS W CONTRAST    Result Date: 2/5/2023  Narrative: EXAM: CTA CHEST PULMONARY EMBOLISM, CT ABDOMEN PELVIS W CONTRAST HISTORY: PE suspected, high prob Dialysis patient, pt receiving dialysis tomorrow. PE suspected. Abdominal abscess/infection suspected. Sepsis, unclear source COMPARISON: None. TECHNIQUE: CTA of the chest was performed after intravenous administration of 100 mL Omnipaque 350.  Subsequent postcontrast CT of abdomen and pelvis was obtained.  3-D MIP reformations are also provided. 3-D reformations were created on the acquisition workstation. The mA and/or Kv were adjusted based on patient's size. FINDINGS: CHEST: PULMONARY ARTERIES: No pulmonary embolus is identified to the opacified subsegmental pulmonary arteries. LUNGS: Central airways are patent.  Left lower lobe nodular and consolidative opacities.  3 mm nodule right upper lobe PLEURA: No pleural effusion. HEART: The heart is normal in size. There is no pericardial effusion. VESSELS: Thoracic aorta and main pulmonary artery are normal caliber. MEDIASTINUM AND SAMUEL: No enlarged mediastinal or hilar lymph nodes are seen. CHEST WALL: Unremarkable BONES: There are mild spinal degenerative changes. ABDOMEN/PELVIS: LIVER: Unremarkable. GALLBLADDER AND BILIARY TREE: Unremarkable. PANCREAS: Unremarkable. SPLEEN: Unremarkable. ADRENAL GLANDS: Unremarkable. KIDNEYS: Small bilateral renal cortical hypodensities too small  to characterize likely benign/cysts.  Bilateral renal cortical atrophy. PELVIS: Prostate gland is enlarged.  There is a hyper enhancement in the prostate gland which is nonspecific. BOWEL: Colonic diverticulosis.  Oral contrast is seen in the distal small bowel.  Percutaneous adjustment to balloon in the stomach.  No dilated bowel loops.  Large colonic stool burden.  The appendix is not visualized. VESSELS: The abdominal aorta is normal caliber. LYMPH NODES: There are no enlarged lymph nodes seen. OTHER: There is no free fluid or free air. SOFT TISSUES: Unremarkable. BONES: There are mild spinal degenerative changes.  Mild compression deformity of L4 vertebral body, likely chronic.     Impression: 1.    Left lower lobe pneumonia.  No pulmonary embolism. 2.   3 mm right upper lobe nodule.  Consider follow-up CT chest in 12 months patient is high risk for lung cancer.  The previously noted nodule on chest x-ray may have been a nipple shadow. 3.    No acute findings in the abdomen or pelvis. 4.    Several incidental/chronic findings detailed above. Electronically Signed by: FRANCISCA LOCKETT M.D. Signed on: 2/5/2023 11:05 PM     XR HUMERUS 2 VIEWS LEFT    Result Date: 2/6/2023  Narrative: EXAM: XR HUMERUS 2 VIEWS LEFT CLINICAL INDICATION: Hypotension, hemodialysis COMPARISON: None. FINDINGS: No fracture or dislocation.  An IV line is noted in the antecubital fossa.  Soft tissues are normal.  Imaged left lung is clear.     Impression:  No acute osseous abnormality. Electronically Signed by: FRANCISCA LOCKETT M.D. Signed on: 2/6/2023 5:55 PM     FL Video Swallow    Result Date: 2/7/2023  Narrative: FLUOROSCOPIC VIDEO SWALLOW EXAMINATION CLINICAL INDICATION: Dysphagia COMPARISON: None. TECHNIQUE: Fluoroscopic evaluation of the neck was performed in lateral projection. The patient was administered multiple consistencies of barium. The study is performed in conjunction with speech pathology. Fluoroscopy time: 2.8  minutes Number of saved images: [id number] FINDINGS: Thin liquid: Aspiration Nectar thick liquid: Aspiration Honey thick liquid: There was no laryngeal penetration or aspiration with any swallowing trials. Pharyngeal residue. Puree: There was no laryngeal penetration or aspiration with any swallowing trials. Pharyngeal residue Soft solids:  Eventual aspiration of residue.     Impression: 1.    Aspiration of thin and nectar thick liquid consistencies. Eventual aspiration of soft solids residue Please see the speech pathologist's notes for additional information and recommendations. Electronically Signed by: FRANCISCA LOCKETT M.D. Signed on: 2/7/2023 4:06 PM     IR TEMPORARY DIALYSIS CATHETER INSERTION AGE 6 OR OLDER    Result Date: 2/8/2023  Narrative: PROCEDURE: NONTUNNELED CENTRAL VENOUS CATHETER EXCHANGE INTERVENTIONALIST:  Antonio Walden M.D ASSISTANT(S): None CLINICAL HISTORY: PREPROCEDURE DIAGNOSIS:  Malfunctioning central venous catheter. INDICATION: 86 years old Male with renal dysfunction and a malfunctioning central venous catheter, who requires venous access for hemodialysis. POST PROCEDURE DIAGNOSIS: Same     Impression: Technically successful exchange of a dual lumen dialysis catheter. PLAN: The catheter is in good position and ready for immediate use. ___________________________________________________________________________ _____________________________________ PROCEDURES PERFORMED: Fluoroscopic Guided Tunneled Central Venous Catheter Exchange ANESTHESIA/SEDATION: Conscious sedation was not used for the procedure. PROPHYLACTIC ANTIBIOTIC: None PREPARATION: The site was prepared and draped and all elements of maximal sterile barrier technique including sterile gloves, sterile gown, mask, large sterile sheet, hand hygiene and cutaneous antisepsis with 2% chlorhexidine +70% isopropyl alcohol were used. The benefits, potential risk, and alternatives of the procedure were explained to the patient, including but  not limited to bleeding, infection and even failure of intended outcome.  In addition, the patient was informed of the right to refused the procedure.  Informed consent was signed and documented in the medical record. PROCEDURE/FINDINGS: FLUOROSCOPY-GUIDED TUNNELED CATHETER EXCHANGE: Local anesthesia was administered.  A guidewire was passed centrally through the catheter, using fluoroscopic guidance.  Over the wire. The existing catheter was removed and replaced with a new catheter.  The new catheter was flushed with citrate.  A final fluoroscopic image was sent to the PACS for documentation.      Catheter type: Duoglide      Catheter tip location: Superior cavoatrial junction      Catheter size: 13 Arabic      Catheter Length: 20 cm      Closure: Secured with nonabsorbable suture. A sterile bandage was applied.      Additional description of procedure: None COMPLICATIONS: None SPECIMENS REMOVED: None ESTIMATED BLOOD LOSS: Minimal RADIATION/CONTRAST DOSE: FLUOROSCOPY TIME: 0.1 minutes. CUMULATIVE AIR KERMA: 0.1 mGy Electronically Signed by: ABY BROWNE MD Signed on: 2/8/2023 3:34 PM        CASE DISCUSSED WITH:  Patient, Family, RN, and     Patient's PCP, Robel Springer MD, was notified and admission was discussed.      TIME TAKEN FOR DISCHARGE:  >40 minutes        Kristel Kat MD   2/8/2023    Admission

## 2023-05-24 NOTE — PATIENT PROFILE ADULT - FALL HARM RISK - HARM RISK INTERVENTIONS

## 2023-05-25 LAB
A1C WITH ESTIMATED AVERAGE GLUCOSE RESULT: 5.9 % — HIGH (ref 4–5.6)
ALBUMIN SERPL ELPH-MCNC: 2.9 G/DL — LOW (ref 3.5–5.2)
ALP SERPL-CCNC: 87 U/L — SIGNIFICANT CHANGE UP (ref 30–115)
ALT FLD-CCNC: 16 U/L — SIGNIFICANT CHANGE UP (ref 0–41)
ANION GAP SERPL CALC-SCNC: 11 MMOL/L — SIGNIFICANT CHANGE UP (ref 7–14)
AST SERPL-CCNC: 21 U/L — SIGNIFICANT CHANGE UP (ref 0–41)
BASOPHILS # BLD AUTO: 0.03 K/UL — SIGNIFICANT CHANGE UP (ref 0–0.2)
BASOPHILS NFR BLD AUTO: 0.4 % — SIGNIFICANT CHANGE UP (ref 0–1)
BILIRUB SERPL-MCNC: 0.2 MG/DL — SIGNIFICANT CHANGE UP (ref 0.2–1.2)
BUN SERPL-MCNC: 29 MG/DL — HIGH (ref 10–20)
CALCIUM SERPL-MCNC: 7.5 MG/DL — LOW (ref 8.4–10.5)
CHLORIDE SERPL-SCNC: 110 MMOL/L — SIGNIFICANT CHANGE UP (ref 98–110)
CK MB CFR SERPL CALC: 1.3 NG/ML — SIGNIFICANT CHANGE UP (ref 0.6–6.3)
CO2 SERPL-SCNC: 20 MMOL/L — SIGNIFICANT CHANGE UP (ref 17–32)
CREAT ?TM UR-MCNC: 36 MG/DL — SIGNIFICANT CHANGE UP
CREAT SERPL-MCNC: 1.6 MG/DL — HIGH (ref 0.7–1.5)
E COLI DNA BLD POS QL NAA+NON-PROBE: SIGNIFICANT CHANGE UP
EGFR: 31 ML/MIN/1.73M2 — LOW
EOSINOPHIL # BLD AUTO: 0.07 K/UL — SIGNIFICANT CHANGE UP (ref 0–0.7)
EOSINOPHIL NFR BLD AUTO: 0.9 % — SIGNIFICANT CHANGE UP (ref 0–8)
ESTIMATED AVERAGE GLUCOSE: 123 MG/DL — HIGH (ref 68–114)
FERRITIN SERPL-MCNC: 340 NG/ML — HIGH (ref 15–150)
FOLATE SERPL-MCNC: 7 NG/ML — SIGNIFICANT CHANGE UP
GLUCOSE SERPL-MCNC: 106 MG/DL — HIGH (ref 70–99)
GRAM STN FLD: SIGNIFICANT CHANGE UP
HCT VFR BLD CALC: 29.4 % — LOW (ref 37–47)
HGB BLD-MCNC: 9.2 G/DL — LOW (ref 12–16)
IMM GRANULOCYTES NFR BLD AUTO: 0.9 % — HIGH (ref 0.1–0.3)
LYMPHOCYTES # BLD AUTO: 0.81 K/UL — LOW (ref 1.2–3.4)
LYMPHOCYTES # BLD AUTO: 10.1 % — LOW (ref 20.5–51.1)
MAGNESIUM SERPL-MCNC: 2.5 MG/DL — HIGH (ref 1.8–2.4)
MCHC RBC-ENTMCNC: 30.4 PG — SIGNIFICANT CHANGE UP (ref 27–31)
MCHC RBC-ENTMCNC: 31.3 G/DL — LOW (ref 32–37)
MCV RBC AUTO: 97 FL — SIGNIFICANT CHANGE UP (ref 81–99)
METHOD TYPE: SIGNIFICANT CHANGE UP
MONOCYTES # BLD AUTO: 0.75 K/UL — HIGH (ref 0.1–0.6)
MONOCYTES NFR BLD AUTO: 9.4 % — HIGH (ref 1.7–9.3)
NEUTROPHILS # BLD AUTO: 6.28 K/UL — SIGNIFICANT CHANGE UP (ref 1.4–6.5)
NEUTROPHILS NFR BLD AUTO: 78.3 % — HIGH (ref 42.2–75.2)
NRBC # BLD: 0 /100 WBCS — SIGNIFICANT CHANGE UP (ref 0–0)
OSMOLALITY UR: 422 MOS/KG — SIGNIFICANT CHANGE UP (ref 50–1200)
PHOSPHATE SERPL-MCNC: 2 MG/DL — LOW (ref 2.1–4.9)
PLATELET # BLD AUTO: 110 K/UL — LOW (ref 130–400)
PMV BLD: 11.8 FL — HIGH (ref 7.4–10.4)
POTASSIUM SERPL-MCNC: 4.1 MMOL/L — SIGNIFICANT CHANGE UP (ref 3.5–5)
POTASSIUM SERPL-SCNC: 4.1 MMOL/L — SIGNIFICANT CHANGE UP (ref 3.5–5)
PROCALCITONIN SERPL-MCNC: 23.2 NG/ML — HIGH (ref 0.02–0.1)
PROT SERPL-MCNC: 5 G/DL — LOW (ref 6–8)
RBC # BLD: 3.03 M/UL — LOW (ref 4.2–5.4)
RBC # BLD: 3.03 M/UL — LOW (ref 4.2–5.4)
RBC # FLD: 13.3 % — SIGNIFICANT CHANGE UP (ref 11.5–14.5)
RETICS #: 32.4 K/UL — SIGNIFICANT CHANGE UP (ref 25–125)
RETICS/RBC NFR: 1.1 % — SIGNIFICANT CHANGE UP (ref 0.5–1.5)
SODIUM SERPL-SCNC: 141 MMOL/L — SIGNIFICANT CHANGE UP (ref 135–146)
SODIUM UR-SCNC: 136 MMOL/L — SIGNIFICANT CHANGE UP
TROPONIN T SERPL-MCNC: 0.07 NG/ML — CRITICAL HIGH
UUN UR-MCNC: 309 MG/DL — SIGNIFICANT CHANGE UP
VIT B12 SERPL-MCNC: 281 PG/ML — SIGNIFICANT CHANGE UP (ref 232–1245)
WBC # BLD: 8.01 K/UL — SIGNIFICANT CHANGE UP (ref 4.8–10.8)
WBC # FLD AUTO: 8.01 K/UL — SIGNIFICANT CHANGE UP (ref 4.8–10.8)

## 2023-05-25 PROCEDURE — 93970 EXTREMITY STUDY: CPT | Mod: 26

## 2023-05-25 PROCEDURE — 99223 1ST HOSP IP/OBS HIGH 75: CPT

## 2023-05-25 PROCEDURE — 99233 SBSQ HOSP IP/OBS HIGH 50: CPT

## 2023-05-25 RX ORDER — AMLODIPINE BESYLATE 2.5 MG/1
2.5 TABLET ORAL DAILY
Refills: 0 | Status: DISCONTINUED | OUTPATIENT
Start: 2023-05-25 | End: 2023-05-25

## 2023-05-25 RX ORDER — LOSARTAN POTASSIUM 100 MG/1
100 TABLET, FILM COATED ORAL DAILY
Refills: 0 | Status: DISCONTINUED | OUTPATIENT
Start: 2023-05-25 | End: 2023-05-25

## 2023-05-25 RX ORDER — SENNA PLUS 8.6 MG/1
2 TABLET ORAL AT BEDTIME
Refills: 0 | Status: DISCONTINUED | OUTPATIENT
Start: 2023-05-25 | End: 2023-05-27

## 2023-05-25 RX ORDER — AMLODIPINE BESYLATE 2.5 MG/1
10 TABLET ORAL DAILY
Refills: 0 | Status: DISCONTINUED | OUTPATIENT
Start: 2023-05-25 | End: 2023-05-27

## 2023-05-25 RX ORDER — CEFTRIAXONE 500 MG/1
2000 INJECTION, POWDER, FOR SOLUTION INTRAMUSCULAR; INTRAVENOUS EVERY 24 HOURS
Refills: 0 | Status: DISCONTINUED | OUTPATIENT
Start: 2023-05-25 | End: 2023-05-27

## 2023-05-25 RX ADMIN — Medication 975 MILLIGRAM(S): at 14:15

## 2023-05-25 RX ADMIN — SODIUM CHLORIDE 100 MILLILITER(S): 9 INJECTION INTRAMUSCULAR; INTRAVENOUS; SUBCUTANEOUS at 05:35

## 2023-05-25 RX ADMIN — HEPARIN SODIUM 5000 UNIT(S): 5000 INJECTION INTRAVENOUS; SUBCUTANEOUS at 21:15

## 2023-05-25 RX ADMIN — CHLORHEXIDINE GLUCONATE 1 APPLICATION(S): 213 SOLUTION TOPICAL at 05:41

## 2023-05-25 RX ADMIN — AMLODIPINE BESYLATE 10 MILLIGRAM(S): 2.5 TABLET ORAL at 11:59

## 2023-05-25 RX ADMIN — SIMVASTATIN 20 MILLIGRAM(S): 20 TABLET, FILM COATED ORAL at 21:15

## 2023-05-25 RX ADMIN — CEFTRIAXONE 100 MILLIGRAM(S): 500 INJECTION, POWDER, FOR SOLUTION INTRAMUSCULAR; INTRAVENOUS at 18:08

## 2023-05-25 RX ADMIN — SENNA PLUS 2 TABLET(S): 8.6 TABLET ORAL at 21:14

## 2023-05-25 RX ADMIN — Medication 63.75 MILLIMOLE(S): at 11:59

## 2023-05-25 RX ADMIN — HEPARIN SODIUM 5000 UNIT(S): 5000 INJECTION INTRAVENOUS; SUBCUTANEOUS at 14:37

## 2023-05-25 RX ADMIN — HEPARIN SODIUM 5000 UNIT(S): 5000 INJECTION INTRAVENOUS; SUBCUTANEOUS at 05:36

## 2023-05-25 RX ADMIN — SODIUM CHLORIDE 100 MILLILITER(S): 9 INJECTION INTRAMUSCULAR; INTRAVENOUS; SUBCUTANEOUS at 08:00

## 2023-05-25 RX ADMIN — PANTOPRAZOLE SODIUM 40 MILLIGRAM(S): 20 TABLET, DELAYED RELEASE ORAL at 05:36

## 2023-05-25 RX ADMIN — Medication 975 MILLIGRAM(S): at 14:45

## 2023-05-25 NOTE — CHART NOTE - NSCHARTNOTEFT_GEN_A_CORE
MICU Transfer Note  ---------------------------    Transfer from: MICU  Transfer to:  ( x ) Medicine   Accepting Physician:      MICU COURSE    Patient is a 88y old  Female with PMH of Colon cancer, S/P colon resection, HTN, HLD, Status post right knee replacement, now presents to the ER for evaluation of fever, and malaise. On admission, she found to have fever, tachycardia, Hypotension, BP of 86/50  and positive Urine analysis.  She has given 2 liters NS and started on IVF and antibiotics.   She has admitted in ICU. Patient BP normalized; currently off IVF with good oral intake; patient has positive blood culture for E coli. Patient has been afebrile past 24 hours. leukopenia has resolved however patient still has thrombocytopenia and anemia, Patient on chemo regimen past 6 months. LAZARO improving with fluids   stable for medical floor    To-Do:    [ ] continue Rocephin 1 gram daily   [ ] follow up E coli sensitivities; follow up ID recs  [ ] resume home losartan HTZ when creatinine stable; decrease amlodipine back to her home dose  [ ] out patient oncology follow up  [ ] physical therapy     OBJECTIVE --  Vital Signs Last 24 Hrs  T(C): 37.1 (25 May 2023 07:00), Max: 37.2 (24 May 2023 14:19)  T(F): 98.7 (25 May 2023 07:00), Max: 99 (24 May 2023 14:19)  HR: 82 (25 May 2023 07:00) (64 - 82)  BP: 167/74 (25 May 2023 07:00) (107/53 - 168/76)  BP(mean): 106 (25 May 2023 07:00) (76 - 109)  RR: 18 (25 May 2023 07:00) (18 - 27)  SpO2: 93% (25 May 2023 07:00) (92% - 95%)    Parameters below as of 25 May 2023 07:00  Patient On (Oxygen Delivery Method): room air        I&O's Summary    24 May 2023 07:01  -  25 May 2023 07:00  --------------------------------------------------------  IN: 1570 mL / OUT: 1653 mL / NET: -83 mL        MEDICATIONS  (STANDING):  amLODIPine   Tablet 10 milliGRAM(s) Oral daily  cefTRIAXone   IVPB 1000 milliGRAM(s) IV Intermittent every 24 hours  chlorhexidine 2% Cloths 1 Application(s) Topical <User Schedule>  heparin   Injectable 5000 Unit(s) SubCutaneous every 8 hours  pantoprazole    Tablet 40 milliGRAM(s) Oral before breakfast  senna 2 Tablet(s) Oral at bedtime  simvastatin 20 milliGRAM(s) Oral at bedtime  sodium phosphate 15 milliMole(s)/250 mL IVPB 15 milliMole(s) IV Intermittent once    MEDICATIONS  (PRN):  acetaminophen     Tablet .. 975 milliGRAM(s) Oral every 6 hours PRN Mild Pain (1 - 3)        LABS                                            9.2                   Neurophils% (auto):   78.3   (05-25 @ 05:28):    8.01 )-----------(110          Lymphocytes% (auto):  10.1                                          29.4                   Eosinphils% (auto):   0.9      Manual%: Neutrophils x    ; Lymphocytes x    ; Eosinophils x    ; Bands%: x    ; Blasts x                                    141    |  110    |  29                  Calcium: 7.5   / iCa: x      (05-25 @ 05:28)    ----------------------------<  106       Magnesium: 2.5                              4.1     |  20     |  1.6              Phosphorous: 2.0      TPro  5.0    /  Alb  2.9    /  TBili  0.2    /  DBili  x      /  AST  21     /  ALT  16     /  AlkPhos  87     25 May 2023 05:28            ASSESSMENT & PLAN:       Septic on admission  UTI   Bacteremia - E. Coli  Pancytopenia - improving - sepsis induced  Colon cancer s/p resection  LAZARO on CKD    PLAN:    CNS: Avoid sedation     HEENT: Oral care    PULMONARY:  HOB @ 45 degrees.  Aspiration precautions. on room air.      CARDIOVASCULAR: DC IVF. Avoid volume overload. Strict I's and O's. ECHO 56 % EF. Trend CE trended down. MAP>65.     GI: PO Feeding. Bowel regimen     RENAL: Follow up lytes. Correct as needed. No hydronephrosis. Urine lytes.   Nephro follow up    INFECTIOUS DISEASE: Follow up blood and urine cultures. Procal 19.0. C/w Rocephin for UTI    HEMATOLOGICAL:  DVT prophylaxis. LE duplex.     ENDOCRINE:  Follow up FS.  Insulin protocol if needed.    MUSCULOSKELETAL: IAT. PT / rehab    DGTF MICU Transfer Note  ---------------------------    Transfer from: MICU  Transfer to:  ( x ) Medicine   Accepting Physician: Doctor Sosa      MICU COURSE    Patient is a 88y old  Female with PMH of Colon cancer, S/P colon resection, HTN, HLD, Status post right knee replacement, now presents to the ER for evaluation of fever, and malaise. On admission, she found to have fever, tachycardia, Hypotension, BP of 86/50  and positive Urine analysis.  She has given 2 liters NS and started on IVF and antibiotics.   She has admitted in ICU. Patient BP normalized; currently off IVF with good oral intake; patient has positive blood culture for E coli. Patient has been afebrile past 24 hours. leukopenia has resolved however patient still has thrombocytopenia and anemia, Patient on chemo regimen past 6 months. LAZARO improving with fluids   stable for medical floor    To-Do:    [ ] continue Rocephin 1 gram daily   [ ] follow up E coli sensitivities; follow up ID recs  [ ] resume home losartan HTZ when creatinine stable; decrease amlodipine back to her home dose  [ ] out patient oncology follow up  [ ] physical therapy     OBJECTIVE --  Vital Signs Last 24 Hrs  T(C): 37.1 (25 May 2023 07:00), Max: 37.2 (24 May 2023 14:19)  T(F): 98.7 (25 May 2023 07:00), Max: 99 (24 May 2023 14:19)  HR: 82 (25 May 2023 07:00) (64 - 82)  BP: 167/74 (25 May 2023 07:00) (107/53 - 168/76)  BP(mean): 106 (25 May 2023 07:00) (76 - 109)  RR: 18 (25 May 2023 07:00) (18 - 27)  SpO2: 93% (25 May 2023 07:00) (92% - 95%)    Parameters below as of 25 May 2023 07:00  Patient On (Oxygen Delivery Method): room air        I&O's Summary    24 May 2023 07:01  -  25 May 2023 07:00  --------------------------------------------------------  IN: 1570 mL / OUT: 1653 mL / NET: -83 mL        MEDICATIONS  (STANDING):  amLODIPine   Tablet 10 milliGRAM(s) Oral daily  cefTRIAXone   IVPB 1000 milliGRAM(s) IV Intermittent every 24 hours  chlorhexidine 2% Cloths 1 Application(s) Topical <User Schedule>  heparin   Injectable 5000 Unit(s) SubCutaneous every 8 hours  pantoprazole    Tablet 40 milliGRAM(s) Oral before breakfast  senna 2 Tablet(s) Oral at bedtime  simvastatin 20 milliGRAM(s) Oral at bedtime  sodium phosphate 15 milliMole(s)/250 mL IVPB 15 milliMole(s) IV Intermittent once    MEDICATIONS  (PRN):  acetaminophen     Tablet .. 975 milliGRAM(s) Oral every 6 hours PRN Mild Pain (1 - 3)        LABS                                            9.2                   Neurophils% (auto):   78.3   (05-25 @ 05:28):    8.01 )-----------(110          Lymphocytes% (auto):  10.1                                          29.4                   Eosinphils% (auto):   0.9      Manual%: Neutrophils x    ; Lymphocytes x    ; Eosinophils x    ; Bands%: x    ; Blasts x                                    141    |  110    |  29                  Calcium: 7.5   / iCa: x      (05-25 @ 05:28)    ----------------------------<  106       Magnesium: 2.5                              4.1     |  20     |  1.6              Phosphorous: 2.0      TPro  5.0    /  Alb  2.9    /  TBili  0.2    /  DBili  x      /  AST  21     /  ALT  16     /  AlkPhos  87     25 May 2023 05:28            ASSESSMENT & PLAN:       Septic on admission  UTI   Bacteremia - E. Coli  Pancytopenia - improving - sepsis induced  Colon cancer s/p resection  LAZARO on CKD    PLAN:    CNS: Avoid sedation     HEENT: Oral care    PULMONARY:  HOB @ 45 degrees.  Aspiration precautions. on room air.      CARDIOVASCULAR: DC IVF. Avoid volume overload. Strict I's and O's. ECHO 56 % EF. Trend CE trended down. MAP>65.     GI: PO Feeding. Bowel regimen     RENAL: Follow up lytes. Correct as needed. No hydronephrosis. Urine lytes.   Nephro follow up    INFECTIOUS DISEASE: Follow up blood and urine cultures. Procal 19.0. C/w Rocephin for UTI    HEMATOLOGICAL:  DVT prophylaxis. LE duplex.     ENDOCRINE:  Follow up FS.  Insulin protocol if needed.    MUSCULOSKELETAL: IAT. PT / rehab    DGTF

## 2023-05-25 NOTE — PHYSICAL THERAPY INITIAL EVALUATION ADULT - ADDITIONAL COMMENTS
Pt lives with her daughter in a private house, with 11 steps to enter the house but none once inside. Pt ambulated with a SC prior to admission. Pt has HHA 2-7pm M-F.

## 2023-05-25 NOTE — CONSULT NOTE ADULT - SUBJECTIVE AND OBJECTIVE BOX
87 y/o f pmh HTN, HLD, colon ca presents for progressive malaise and fever admitted to ICU for sepsis found to have elevated troponin. Pt is primarily Bengali speaking, Additional history per DTR. Pt at home mostly complaining of lower back pain and fever for past few days also notes mild sob. Otherwise denies chest pain, palpitation, n/v, dizziness, diaphoresis. No history of cardiac disease              PAST MEDICAL & SURGICAL HISTORY  Colon cancer    HTN (hypertension)    Dyslipidemia    S/P colon resection    Status post right knee replacement        FAMILY HISTORY:  FAMILY HISTORY:      SOCIAL HISTORY:  []smoker  []Alcohol  []Drug    ALLERGIES:  No Known Allergies      MEDICATIONS:  MEDICATIONS  (STANDING):  amLODIPine   Tablet 10 milliGRAM(s) Oral daily  cefTRIAXone   IVPB 1000 milliGRAM(s) IV Intermittent every 24 hours  chlorhexidine 2% Cloths 1 Application(s) Topical <User Schedule>  heparin   Injectable 5000 Unit(s) SubCutaneous every 8 hours  pantoprazole    Tablet 40 milliGRAM(s) Oral before breakfast  senna 2 Tablet(s) Oral at bedtime  simvastatin 20 milliGRAM(s) Oral at bedtime    MEDICATIONS  (PRN):  acetaminophen     Tablet .. 975 milliGRAM(s) Oral every 6 hours PRN Mild Pain (1 - 3)      HOME MEDICATIONS:  Home Medications:  amLODIPine 2.5 mg oral tablet: 1 tab(s) orally once a day (24 May 2023 14:18)  losartan-hydroCHLOROthiazide 100 mg-25 mg oral tablet: 1 tab(s) orally once a day (24 May 2023 14:16)  memantine 14 mg oral capsule, extended release: 1 tab(s) orally once a day (24 May 2023 14:18)  simvastatin 20 mg oral tablet: 1 tab(s) orally once a day (at bedtime) (24 May 2023 14:17)  traZODone 50 mg oral tablet: 1 tab(s) orally once a day (24 May 2023 14:17)      VITALS:   T(F): 98.7 (05-25 @ 07:00), Max: 104.5 (05-23 @ 23:36)  HR: 75 (05-25 @ 11:53) (56 - 106)  BP: 150/72 (05-25 @ 11:53) (86/50 - 168/76)  BP(mean): 103 (05-25 @ 11:53) (66 - 109)  RR: 25 (05-25 @ 11:53) (18 - 27)  SpO2: 93% (05-25 @ 11:53) (92% - 99%)    I&O's Summary    24 May 2023 07:01  -  25 May 2023 07:00  --------------------------------------------------------  IN: 1670 mL / OUT: 1653 mL / NET: 17 mL    25 May 2023 07:01  -  25 May 2023 13:30  --------------------------------------------------------  IN: 450 mL / OUT: 500 mL / NET: -50 mL        REVIEW OF SYSTEMS:  CONSTITUTIONAL: No weakness, fevers or chills  EYES: No visual changes  ENT: No vertigo or throat pain   NECK: No pain or stiffness  RESPIRATORY: No cough, wheezing, hemoptysis; No shortness of breath  CARDIOVASCULAR: No chest pain or palpitations  GASTROINTESTINAL: No abdominal or epigastric pain. No nausea, vomiting, or hematemesis; No diarrhea or constipation. No melena or hematochezia.  GENITOURINARY: No dysuria, frequency or hematuria  NEUROLOGICAL: No numbness or weakness  SKIN: No itching, no rashes  MSK: no    PHYSICAL EXAM:  NEURO: patient is awake , alert and oriented  GEN: Not in acute distress  NECK: no thyroid enlargement, no JVD  LUNGS: Clear to auscultation bilaterally   CARDIOVASCULAR: S1/S2 present, RRR , no murmurs or rubs, no carotid bruits,  + PP bilaterally  ABD: Soft, non-tender, non-distended, +BS  EXT: No CAROL  SKIN: Intact    LABS:                        9.2    8.01  )-----------( 110      ( 25 May 2023 05:28 )             29.4     05-25    141  |  110  |  29<H>  ----------------------------<  106<H>  4.1   |  20  |  1.6<H>    Ca    7.5<L>      25 May 2023 05:28  Phos  2.0     05-25  Mg     2.5     05-25    TPro  5.0<L>  /  Alb  2.9<L>  /  TBili  0.2  /  DBili  x   /  AST  21  /  ALT  16  /  AlkPhos  87  05-25    PT/INR - ( 23 May 2023 23:35 )   PT: 10.90 sec;   INR: 0.96 ratio         PTT - ( 23 May 2023 23:35 )  PTT:23.2 sec  Troponin T, Serum: 0.07 ng/mL *HH* (05-25-23 @ 05:28)    CARDIAC MARKERS ( 25 May 2023 05:28 )  x     / 0.07 ng/mL / x     / x     / 1.3 ng/mL  CARDIAC MARKERS ( 24 May 2023 11:30 )  x     / 0.11 ng/mL / x     / x     / x      CARDIAC MARKERS ( 23 May 2023 23:35 )  x     / 0.09 ng/mL / x     / x     / x            Troponin trend:            RADIOLOGY:  < from: 12 Lead ECG (05.23.23 @ 23:20) >    Ventricular Rate 100 BPM    Atrial Rate 100 BPM    P-R Interval 136 ms    QRS Duration 74 ms    Q-T Interval 336 ms    QTC Calculation(Bazett) 433 ms    P Axis 60 degrees    R Axis 20 degrees    T Axis 64 degrees    Diagnosis Line Normal sinus rhythm  Nonspecific ST abnormality  Abnormal ECG    Confirmed by KENYATTA QUIROS MD ([id number]) on 5/24/2023 12:49:33 PM    < end of copied text >    < from: TTE Echo Complete w/o Contrast w/ Doppler (05.24.23 @ 09:23) >  Summary:   1. LV Ejection Fraction by Mae's Method with a biplane EF of 56 %.   2. Mildly enlarged left atrium.   3. Mild mitral valve regurgitation.   4. Mild-moderate tricuspid regurgitation.   5. Mild aortic regurgitation.   6. Sclerotic aortic valve with normal opening.   7. Estimated pulmonary artery systolic pressure is 45.8 mmHg assuming a   right atrial pressure of 3 mmHg, which is consistent with mild pulmonary   hypertension.    < end of copied text >      ECG:    TELEMETRY EVENTS:

## 2023-05-25 NOTE — PHYSICAL THERAPY INITIAL EVALUATION ADULT - GENERAL OBSERVATIONS, REHAB EVAL
PT IE 13:15-13:45. Pt was seen for PT IE, chart thoroughly reviewed, pt was agreeable, RN Amalia is aware. Pt was received semi fountain in bed in no apparent distress, +hep lock, +primafit, +telemonitoring, +BP cuff and +pulse ox. AM-PAC mobility score is 15.

## 2023-05-25 NOTE — CONSULT NOTE ADULT - NS ATTEND AMEND GEN_ALL_CORE FT
89 y/o f pmh HTN, HLD, colon ca presents for progressive malaise and fever admitted to ICU for sepsis found to have elevated troponin.Patient family present. No hx cardiac problems. She presented septic. Trop mildly up decreasing. Demand ischemia. Echo ef 56%. Would consider outpatient Adenocard thallium in 6 weeks. Family aware. Note patient has probable CHARLY.

## 2023-05-25 NOTE — CONSULT NOTE ADULT - ASSESSMENT
87 y/o f pmh HTN, HLD, colon ca presents for progressive malaise and fever admitted to ICU for sepsis found to have elevated troponin.    Trop 0.09>0.11>>0.07 mildly elevated but flat; CKMB WNL  ecg nsr non-specific  TTE EF 56% unremarkable    Impression  # Elevated troponin  likely demand ischemia in setting of sepsis    Plan  - Pt denies chest pain and SOB, no clinical evidence of ACS  - Follow cultures  - Treat infection  - no further cardiac workup warranted at this time  - recall cards prn    89 y/o f pmh HTN, HLD, colon ca presents for progressive malaise and fever admitted to ICU for sepsis found to have elevated troponin.    Trop 0.09>0.11>>0.07 mildly elevated but flat; CKMB WNL  ecg nsr non-specific  TTE EF 56% unremarkable    Impression  # Elevated troponin  likely demand ischemia in setting of sepsis and LAZARO    Plan  - Pt denies chest pain and SOB, no clinical evidence of ACS  - Follow cultures  - Treat infection  - Avoid nephrotoxic agent  - no further cardiac workup warranted at this time  - recall cards prn

## 2023-05-25 NOTE — PROGRESS NOTE ADULT - ATTENDING COMMENTS
Attending Statement: I have personally performed a face to face diagnostic evaluation on this patient. The patient is suffering from:  Septic on admission  UTI   Bacteremia - E. Coli  Pancytopenia   Colon cancer  I have made amendments to the documentation where necessary. I have personally seen and examined this patient.  I have fully participated in the care of this patient.  I have reviewed all pertinent clinical information, including history, physical exam, plan and note.

## 2023-05-26 LAB
-  AMIKACIN: SIGNIFICANT CHANGE UP
-  AMPICILLIN/SULBACTAM: SIGNIFICANT CHANGE UP
-  AMPICILLIN: SIGNIFICANT CHANGE UP
-  AZTREONAM: SIGNIFICANT CHANGE UP
-  CEFAZOLIN: SIGNIFICANT CHANGE UP
-  CEFEPIME: SIGNIFICANT CHANGE UP
-  CEFOXITIN: SIGNIFICANT CHANGE UP
-  CEFTRIAXONE: SIGNIFICANT CHANGE UP
-  CIPROFLOXACIN: SIGNIFICANT CHANGE UP
-  ERTAPENEM: SIGNIFICANT CHANGE UP
-  GENTAMICIN: SIGNIFICANT CHANGE UP
-  IMIPENEM: SIGNIFICANT CHANGE UP
-  LEVOFLOXACIN: SIGNIFICANT CHANGE UP
-  MEROPENEM: SIGNIFICANT CHANGE UP
-  PIPERACILLIN/TAZOBACTAM: SIGNIFICANT CHANGE UP
-  TOBRAMYCIN: SIGNIFICANT CHANGE UP
-  TRIMETHOPRIM/SULFAMETHOXAZOLE: SIGNIFICANT CHANGE UP
ALBUMIN SERPL ELPH-MCNC: 3.1 G/DL — LOW (ref 3.5–5.2)
ALP SERPL-CCNC: 113 U/L — SIGNIFICANT CHANGE UP (ref 30–115)
ALT FLD-CCNC: 14 U/L — SIGNIFICANT CHANGE UP (ref 0–41)
ANION GAP SERPL CALC-SCNC: 12 MMOL/L — SIGNIFICANT CHANGE UP (ref 7–14)
AST SERPL-CCNC: 17 U/L — SIGNIFICANT CHANGE UP (ref 0–41)
BASOPHILS # BLD AUTO: 0.03 K/UL — SIGNIFICANT CHANGE UP (ref 0–0.2)
BASOPHILS NFR BLD AUTO: 0.5 % — SIGNIFICANT CHANGE UP (ref 0–1)
BILIRUB SERPL-MCNC: <0.2 MG/DL — SIGNIFICANT CHANGE UP (ref 0.2–1.2)
BUN SERPL-MCNC: 18 MG/DL — SIGNIFICANT CHANGE UP (ref 10–20)
CALCIUM SERPL-MCNC: 7.9 MG/DL — LOW (ref 8.4–10.5)
CHLORIDE SERPL-SCNC: 110 MMOL/L — SIGNIFICANT CHANGE UP (ref 98–110)
CO2 SERPL-SCNC: 24 MMOL/L — SIGNIFICANT CHANGE UP (ref 17–32)
CREAT SERPL-MCNC: 1.6 MG/DL — HIGH (ref 0.7–1.5)
CULTURE RESULTS: SIGNIFICANT CHANGE UP
CULTURE RESULTS: SIGNIFICANT CHANGE UP
EGFR: 31 ML/MIN/1.73M2 — LOW
EOSINOPHIL # BLD AUTO: 0.15 K/UL — SIGNIFICANT CHANGE UP (ref 0–0.7)
EOSINOPHIL NFR BLD AUTO: 2.5 % — SIGNIFICANT CHANGE UP (ref 0–8)
GLUCOSE SERPL-MCNC: 132 MG/DL — HIGH (ref 70–99)
HCT VFR BLD CALC: 32.2 % — LOW (ref 37–47)
HGB BLD-MCNC: 10.4 G/DL — LOW (ref 12–16)
IMM GRANULOCYTES NFR BLD AUTO: 1 % — HIGH (ref 0.1–0.3)
LYMPHOCYTES # BLD AUTO: 1.08 K/UL — LOW (ref 1.2–3.4)
LYMPHOCYTES # BLD AUTO: 18 % — LOW (ref 20.5–51.1)
MAGNESIUM SERPL-MCNC: 2.1 MG/DL — SIGNIFICANT CHANGE UP (ref 1.8–2.4)
MCHC RBC-ENTMCNC: 30.6 PG — SIGNIFICANT CHANGE UP (ref 27–31)
MCHC RBC-ENTMCNC: 32.3 G/DL — SIGNIFICANT CHANGE UP (ref 32–37)
MCV RBC AUTO: 94.7 FL — SIGNIFICANT CHANGE UP (ref 81–99)
METHOD TYPE: SIGNIFICANT CHANGE UP
MONOCYTES # BLD AUTO: 0.57 K/UL — SIGNIFICANT CHANGE UP (ref 0.1–0.6)
MONOCYTES NFR BLD AUTO: 9.5 % — HIGH (ref 1.7–9.3)
NEUTROPHILS # BLD AUTO: 4.1 K/UL — SIGNIFICANT CHANGE UP (ref 1.4–6.5)
NEUTROPHILS NFR BLD AUTO: 68.5 % — SIGNIFICANT CHANGE UP (ref 42.2–75.2)
NRBC # BLD: 0 /100 WBCS — SIGNIFICANT CHANGE UP (ref 0–0)
ORGANISM # SPEC MICROSCOPIC CNT: SIGNIFICANT CHANGE UP
PHOSPHATE SERPL-MCNC: 2.7 MG/DL — SIGNIFICANT CHANGE UP (ref 2.1–4.9)
PLATELET # BLD AUTO: 139 K/UL — SIGNIFICANT CHANGE UP (ref 130–400)
PMV BLD: 11 FL — HIGH (ref 7.4–10.4)
POTASSIUM SERPL-MCNC: 4.2 MMOL/L — SIGNIFICANT CHANGE UP (ref 3.5–5)
POTASSIUM SERPL-SCNC: 4.2 MMOL/L — SIGNIFICANT CHANGE UP (ref 3.5–5)
PROT SERPL-MCNC: 5.6 G/DL — LOW (ref 6–8)
RBC # BLD: 3.4 M/UL — LOW (ref 4.2–5.4)
RBC # FLD: 13.1 % — SIGNIFICANT CHANGE UP (ref 11.5–14.5)
SODIUM SERPL-SCNC: 146 MMOL/L — SIGNIFICANT CHANGE UP (ref 135–146)
SPECIMEN SOURCE: SIGNIFICANT CHANGE UP
SPECIMEN SOURCE: SIGNIFICANT CHANGE UP
WBC # BLD: 5.99 K/UL — SIGNIFICANT CHANGE UP (ref 4.8–10.8)
WBC # FLD AUTO: 5.99 K/UL — SIGNIFICANT CHANGE UP (ref 4.8–10.8)

## 2023-05-26 PROCEDURE — 99232 SBSQ HOSP IP/OBS MODERATE 35: CPT

## 2023-05-26 RX ADMIN — CEFTRIAXONE 100 MILLIGRAM(S): 500 INJECTION, POWDER, FOR SOLUTION INTRAMUSCULAR; INTRAVENOUS at 17:49

## 2023-05-26 RX ADMIN — HEPARIN SODIUM 5000 UNIT(S): 5000 INJECTION INTRAVENOUS; SUBCUTANEOUS at 21:57

## 2023-05-26 RX ADMIN — HEPARIN SODIUM 5000 UNIT(S): 5000 INJECTION INTRAVENOUS; SUBCUTANEOUS at 17:54

## 2023-05-26 RX ADMIN — CHLORHEXIDINE GLUCONATE 1 APPLICATION(S): 213 SOLUTION TOPICAL at 05:01

## 2023-05-26 RX ADMIN — PANTOPRAZOLE SODIUM 40 MILLIGRAM(S): 20 TABLET, DELAYED RELEASE ORAL at 05:00

## 2023-05-26 RX ADMIN — SIMVASTATIN 20 MILLIGRAM(S): 20 TABLET, FILM COATED ORAL at 21:58

## 2023-05-26 RX ADMIN — AMLODIPINE BESYLATE 10 MILLIGRAM(S): 2.5 TABLET ORAL at 05:00

## 2023-05-26 RX ADMIN — HEPARIN SODIUM 5000 UNIT(S): 5000 INJECTION INTRAVENOUS; SUBCUTANEOUS at 05:01

## 2023-05-26 RX ADMIN — SENNA PLUS 2 TABLET(S): 8.6 TABLET ORAL at 21:58

## 2023-05-26 RX ADMIN — Medication 975 MILLIGRAM(S): at 06:21

## 2023-05-26 NOTE — PROGRESS NOTE ADULT - SUBJECTIVE AND OBJECTIVE BOX
24H events:    Patient is a 88y old Female who presents with a chief complaint of sepsis (24 May 2023 14:11)    Primary diagnosis of Sepsis secondary to UTI       Today is hospital day 1d. This morning patient was seen and examined at bedside, resting comfortably in bed.    no major events overnight  afebrile      PAST MEDICAL & SURGICAL HISTORY  Colon cancer    HTN (hypertension)    Dyslipidemia    S/P colon resection    Status post right knee replacement      SOCIAL HISTORY:  Social History:      ALLERGIES:  No Known Allergies    MEDICATIONS:  STANDING MEDICATIONS  amLODIPine   Tablet 10 milliGRAM(s) Oral daily  cefTRIAXone   IVPB 1000 milliGRAM(s) IV Intermittent every 24 hours  chlorhexidine 2% Cloths 1 Application(s) Topical <User Schedule>  heparin   Injectable 5000 Unit(s) SubCutaneous every 8 hours  pantoprazole    Tablet 40 milliGRAM(s) Oral before breakfast  simvastatin 20 milliGRAM(s) Oral at bedtime  sodium chloride 0.9%. 1000 milliLiter(s) IV Continuous <Continuous>  sodium phosphate 15 milliMole(s)/250 mL IVPB 15 milliMole(s) IV Intermittent once    PRN MEDICATIONS  acetaminophen     Tablet .. 975 milliGRAM(s) Oral every 6 hours PRN    VITALS:   T(F): 98.7  HR: 82  BP: 167/74  RR: 18  SpO2: 93%    PHYSICAL EXAM:  GENERAL: NAD  NERVOUS SYSTEM:  Alert & Oriented X3, non focal   CHEST/LUNG: Clear to auscultation bilaterally;   HEART: Regular rate and rhythm; No murmurs, rubs, or gallops  ABDOMEN: Soft, Nontender, Nondistended; Bowel sounds present  EXTREMITIES:  No clubbing, cyanosis, or edema    LABS:                        9.2    8.01  )-----------( 110      ( 25 May 2023 05:28 )             29.4     05-25    141  |  110  |  29<H>  ----------------------------<  106<H>  4.1   |  20  |  1.6<H>    Ca    7.5<L>      25 May 2023 05:28  Phos  2.0     05-25  Mg     2.5     05-25    TPro  5.0<L>  /  Alb  2.9<L>  /  TBili  0.2  /  DBili  x   /  AST  21  /  ALT  16  /  AlkPhos  87  05-25    PT/INR - ( 23 May 2023 23:35 )   PT: 10.90 sec;   INR: 0.96 ratio         PTT - ( 23 May 2023 23:35 )  PTT:23.2 sec  Urinalysis Basic - ( 24 May 2023 00:00 )    Color: Yellow / Appearance: Clear / S.020 / pH: x  Gluc: x / Ketone: Negative  / Bili: Negative / Urobili: 0.2 mg/dL   Blood: x / Protein: >=300 mg/dL / Nitrite: Negative   Leuk Esterase: Trace / RBC: 1-2 /HPF / WBC >50 /HPF   Sq Epi: x / Non Sq Epi: x / Bacteria: Occasional /HPF        Troponin T, Serum: 0.07 ng/mL *HH* (23 @ 05:28)  Troponin T, Serum: 0.11 ng/mL *HH* (23 @ 11:30)      Culture - Blood (collected 23 May 2023 23:35)  Source: .Blood Blood-Peripheral  Gram Stain (24 May 2023 22:59):    Growth in aerobic bottle: Gram Negative Rods  Preliminary Report (24 May 2023 23:00):    Growth in aerobic bottle: Gram Negative Rods    ***Blood Panel PCR results on this specimen are available    approximately 3 hours after the Gram stain result.***    Gram stain, PCR, and/or culture results may not always    correspond due to difference in methodologies.    ************************************************************    This PCR assay was performed by multiplex PCR. This    Assay tests for 66 bacterial and resistance gene targets.    Please refer to the [facility] test directory    at [email or web address] for details.  Organism: Blood Culture PCR (25 May 2023 01:27)  Organism: Blood Culture PCR (25 May 2023 01:27)    Culture - Blood (collected 23 May 2023 23:35)  Source: .Blood Blood-Peripheral  Gram Stain (24 May 2023 23:02):    Growth in aerobic and anaerobic bottles: Gram Negative Rods  Preliminary Report (24 May 2023 23:02):    Growth in aerobic and anaerobic bottles: Gram Negative Rods      CARDIAC MARKERS ( 25 May 2023 05:28 )  x     / 0.07 ng/mL / x     / x     / 1.3 ng/mL  CARDIAC MARKERS ( 24 May 2023 11:30 )  x     / 0.11 ng/mL / x     / x     / x      CARDIAC MARKERS ( 23 May 2023 23:35 )  x     / 0.09 ng/mL / x     / x     / x          RADIOLOGY:          
MARJORIE FIGUEROA  88y, Female  Allergy: No Known Allergies      LOS  1d    CHIEF COMPLAINT: sepsis (25 May 2023 13:29)      INTERVAL EVENTS/HPI  - No acute events overnight  - T(F): , Max: 98.7 (23 @ 07:00)  - Denies any worsening symptoms  - Tolerating medication  - WBC Count: 8.01 (23 @ 05:28)  WBC Count: 9.59 (23 @ 11:30)     - Creatinine, Serum: 1.6 (23 @ 05:28)  Creatinine, Serum: 2.3 (23 @ 05:24)       ROS  General: Denies rigors, nightsweats  HEENT: Denies headache, rhinorrhea, sore throat, eye pain  CV: Denies CP, palpitations  PULM: Denies wheezing, hemoptysis  GI: Denies hematemesis, hematochezia, melena  : Denies discharge, hematuria  MSK: Denies arthralgias, myalgias  SKIN: Denies rash, lesions  NEURO: Denies paresthesias, weakness  PSYCH: Denies depression, anxiety    VITALS:  T(F): 98.7, Max: 98.7 (23 @ 07:00)  HR: 75  BP: 150/72  RR: 25Vital Signs Last 24 Hrs  T(C): 37.1 (25 May 2023 07:00), Max: 37.1 (25 May 2023 07:00)  T(F): 98.7 (25 May 2023 07:00), Max: 98.7 (25 May 2023 07:00)  HR: 75 (25 May 2023 11:53) (64 - 82)  BP: 150/72 (25 May 2023 11:53) (107/53 - 168/76)  BP(mean): 103 (25 May 2023 11:53) (76 - 109)  RR: 25 (25 May 2023 11:53) (18 - 27)  SpO2: 93% (25 May 2023 11:53) (92% - 95%)    Parameters below as of 25 May 2023 11:53  Patient On (Oxygen Delivery Method): room air        PHYSICAL EXAM:  Gen: NAD, resting in bed  HEENT: Normocephalic, atraumatic  Neck: supple, no lymphadenopathy  CV: Regular rate & regular rhythm  Lungs: decreased BS at bases, no fremitus  Abdomen: Soft, BS present  Ext: Warm, well perfused  Neuro: non focal, awake  Skin: no rash, no erythema  Lines: no phlebitis    FH: Non-contributory  Social Hx: Non-contributory    TESTS & MEASUREMENTS:                        9.2    8.01  )-----------( 110      ( 25 May 2023 05:28 )             29.4         141  |  110  |  29<H>  ----------------------------<  106<H>  4.1   |  20  |  1.6<H>    Ca    7.5<L>      25 May 2023 05:28  Phos  2.0       Mg     2.5         TPro  5.0<L>  /  Alb  2.9<L>  /  TBili  0.2  /  DBili  x   /  AST  21  /  ALT  16  /  AlkPhos  87        LIVER FUNCTIONS - ( 25 May 2023 05:28 )  Alb: 2.9 g/dL / Pro: 5.0 g/dL / ALK PHOS: 87 U/L / ALT: 16 U/L / AST: 21 U/L / GGT: x           Urinalysis Basic - ( 24 May 2023 00:00 )    Color: Yellow / Appearance: Clear / S.020 / pH: x  Gluc: x / Ketone: Negative  / Bili: Negative / Urobili: 0.2 mg/dL   Blood: x / Protein: >=300 mg/dL / Nitrite: Negative   Leuk Esterase: Trace / RBC: 1-2 /HPF / WBC >50 /HPF   Sq Epi: x / Non Sq Epi: x / Bacteria: Occasional /HPF        Culture - Blood (collected 23 @ 23:35)  Source: .Blood Blood-Peripheral  Gram Stain (23 @ 14:28):    Growth in aerobic bottle: Gram Negative Rods    Growth in anaerobic bottle: Gram Negative Rods  Preliminary Report (23 @ 14:29):    Growth in aerobic bottle: Gram Negative Rods    Growth in anaerobic bottle: Gram Negative Rods    ***Blood Panel PCR results on this specimen are available    approximately 3 hours after the Gram stain result.***    Gram stain, PCR, and/or culture results may not always    correspond due to difference in methodologies.    ************************************************************    This PCR assay was performed by multiplex PCR. This    Assay tests for 66 bacterial and resistance gene targets.    Please refer to the [facility] test directory    at [email or web address] for details.  Organism: Blood Culture PCR (23 @ 01:27)  Organism: Blood Culture PCR (23 @ 01:27)      Method Type: PCR      -  Escherichia coli: Detec    Culture - Blood (collected 23 @ 23:35)  Source: .Blood Blood-Peripheral  Gram Stain (23 @ 23:02):    Growth in aerobic and anaerobic bottles: Gram Negative Rods  Preliminary Report (23 @ 23:02):    Growth in aerobic and anaerobic bottles: Gram Negative Rods        Blood Gas Venous - Lactate: 1.50 mmol/L (23 @ 00:47)  Lactate, Blood: 1.7 mmol/L (23 @ 23:35)      INFECTIOUS DISEASES TESTING  Procalcitonin, Serum: 19.00 (23 @ 05:24)      INFLAMMATORY MARKERS      RADIOLOGY & ADDITIONAL TESTS:  I have personally reviewed the last available Chest xray  CXR      CT  CT Abdomen and Pelvis No Cont:   ACC: [id number] EXAM:  CT ABDOMEN AND PELVIS   ORDERED BY: VIANEY LEMUS     PROCEDURE DATE:  2023          INTERPRETATION:  CLINICAL STATEMENT: Fever, abdominal pain, neutropenia    TECHNIQUE: Contiguous axial CT images were obtained from the lower chest   to the pubic symphysis without intravenous contrast.  Oral contrast was   not administered.  Reformatted images in the coronal and sagittal planes   were acquired.    COMPARISON: None available.      FINDINGS:    LOWER CHEST: Dependentatelectasis.    HEPATOBILIARY: Unremarkable.    SPLEEN: Unremarkable.    PANCREAS: Fatty atrophy of the head and uncinate process.    ADRENAL GLANDS: Unremarkable.    KIDNEYS: No hydronephrosis. Bilateral renal cysts.    ABDOMINOPELVIC NODES: Unremarkable.    PELVIC ORGANS: Unremarkable.    PERITONEUM/MESENTERY/BOWEL: Multiple right colon-small bowel anastomoses.   No evidence of bowel obstruction or pneumoperitoneum. Trace scattered   free fluid.    BONES/SOFT TISSUES: Multiple ventral hernias containing loops of small   bowel without evidence of obstruction or inflammatory change.   Degenerative changes of the spine. Diffuse osteopenia.    OTHER: Calcific atherosclerosis.      IMPRESSION:  No evidence of acute abdominal pathology.    --- End of Report ---          LOGAN GOMEZ MD; Resident Radiologist  This document has been electronically signed.  DEION TREVIÑO MD; Attending Radiologist  This document has been electronically signed. May 24 2023  1:53AM (23 @ 01:20)      CARDIOLOGY TESTING  12 Lead ECG:   Ventricular Rate 59 BPM    Atrial Rate 59 BPM    P-R Interval 148 ms    QRS Duration 72 ms    Q-T Interval 484 ms    QTC Calculation(Bazett) 479 ms    P Axis 29 degrees    R Axis 16 degrees    T Axis 12 degrees    Diagnosis Line Sinus bradycardia  Possible Anterior infarct , age undetermined  Abnormal ECG    Confirmed by Melvin Smith ([id number]) on 2023 8:54:08 AM (23 @ 07:23)  12 Lead ECG:   Ventricular Rate 100 BPM    Atrial Rate 100 BPM    P-R Interval 136 ms    QRS Duration 74 ms    Q-T Interval 336 ms    QTC Calculation(Bazett) 433 ms    P Axis 60 degrees    R Axis 20 degrees    T Axis 64 degrees    Diagnosis Line Normal sinus rhythm  Nonspecific ST abnormality  Abnormal ECG    Confirmed by KENYATTA QUIROS MD ([id number]) on 2023 12:49:33 PM (23 @ 23:20)      MEDICATIONS  amLODIPine   Tablet 10 Oral daily  cefTRIAXone   IVPB 2000 IV Intermittent every 24 hours  chlorhexidine 2% Cloths 1 Topical <User Schedule>  heparin   Injectable 5000 SubCutaneous every 8 hours  pantoprazole    Tablet 40 Oral before breakfast  senna 2 Oral at bedtime  simvastatin 20 Oral at bedtime      WEIGHT  Weight (kg): 70.1 (23 @ 03:15)  Creatinine, Serum: 1.6 mg/dL (23 @ 05:28)      ANTIBIOTICS:  cefTRIAXone   IVPB 2000 milliGRAM(s) IV Intermittent every 24 hours      All available historical records have been reviewed      
MARJORIE FIGUEROA  88y, Female  Allergy: No Known Allergies      LOS  2d    CHIEF COMPLAINT: sepsis (25 May 2023 14:54)      INTERVAL EVENTS/HPI  - No acute events overnight  - T(F): , Max: 97.4 (05-25-23 @ 15:15)  - Denies any worsening symptoms  - Tolerating medication  - WBC Count: 5.99 (05-26-23 @ 09:03)  WBC Count: 8.01 (05-25-23 @ 05:28)     - Creatinine, Serum: 1.6 (05-26-23 @ 09:03)  Creatinine, Serum: 1.6 (05-25-23 @ 05:28)       ROS  General: Denies rigors, nightsweats  HEENT: Denies headache, rhinorrhea, sore throat, eye pain  CV: Denies CP, palpitations  PULM: Denies wheezing, hemoptysis  GI: Denies hematemesis, hematochezia, melena  : Denies discharge, hematuria  MSK: Denies arthralgias, myalgias  SKIN: Denies rash, lesions  NEURO: Denies paresthesias, weakness  PSYCH: Denies depression, anxiety    VITALS:  T(F): 97.2, Max: 97.4 (05-25-23 @ 15:15)  HR: 72  BP: 129/70  RR: 18Vital Signs Last 24 Hrs  T(C): 36.2 (26 May 2023 04:58), Max: 36.3 (25 May 2023 15:15)  T(F): 97.2 (26 May 2023 04:58), Max: 97.4 (25 May 2023 15:15)  HR: 72 (26 May 2023 04:58) (71 - 75)  BP: 129/70 (26 May 2023 04:58) (129/70 - 157/73)  BP(mean): 105 (25 May 2023 23:10) (96 - 105)  RR: 18 (26 May 2023 04:58) (18 - 25)  SpO2: 94% (25 May 2023 23:10) (93% - 94%)    Parameters below as of 25 May 2023 23:10  Patient On (Oxygen Delivery Method): room air        PHYSICAL EXAM:  Gen: NAD, resting in bed  HEENT: Normocephalic, atraumatic  Neck: supple, no lymphadenopathy  CV: Regular rate & regular rhythm  Lungs: decreased BS at bases, no fremitus  Abdomen: Soft, BS present  Ext: Warm, well perfused  Neuro: non focal, awake  Skin: no rash, no erythema  Lines: no phlebitis    FH: Non-contributory  Social Hx: Non-contributory    TESTS & MEASUREMENTS:                        10.4   5.99  )-----------( 139      ( 26 May 2023 09:03 )             32.2     05-26    146  |  110  |  18  ----------------------------<  132<H>  4.2   |  24  |  1.6<H>    Ca    7.9<L>      26 May 2023 09:03  Phos  2.7     05-26  Mg     2.1     05-26    TPro  5.6<L>  /  Alb  3.1<L>  /  TBili  <0.2  /  DBili  x   /  AST  17  /  ALT  14  /  AlkPhos  113  05-26      LIVER FUNCTIONS - ( 26 May 2023 09:03 )  Alb: 3.1 g/dL / Pro: 5.6 g/dL / ALK PHOS: 113 U/L / ALT: 14 U/L / AST: 17 U/L / GGT: x               Culture - Urine (collected 05-24-23 @ 00:00)  Source: Clean Catch Clean Catch (Midstream)  Preliminary Report (05-26-23 @ 01:12):    50,000 - 99,000 CFU/mL Escherichia coli    Culture - Blood (collected 05-23-23 @ 23:35)  Source: .Blood Blood-Peripheral  Gram Stain (05-25-23 @ 14:28):    Growth in aerobic bottle: Gram Negative Rods    Growth in anaerobic bottle: Gram Negative Rods  Preliminary Report (05-26-23 @ 08:44):    Growth in aerobic and anaerobic bottles: Escherichia coli    ***Blood Panel PCR results on this specimen are available    approximately 3 hours after the Gram stain result.***    Gram stain, PCR, and/or culture results may not always    correspond due to difference in methodologies.    ************************************************************    This PCR assay was performed by multiplex PCR. This    Assay tests for 66 bacterial and resistance gene targets.    Please refer to the [facility] test directory    at [email or web address] for details.  Organism: Blood Culture PCR (05-25-23 @ 01:27)  Organism: Blood Culture PCR (05-25-23 @ 01:27)      Method Type: PCR      -  Escherichia coli: Detec    Culture - Blood (collected 05-23-23 @ 23:35)  Source: .Blood Blood-Peripheral  Gram Stain (05-24-23 @ 23:02):    Growth in aerobic and anaerobic bottles: Gram Negative Rods  Preliminary Report (05-25-23 @ 19:08):    Growth in aerobic and anaerobic bottles: Escherichia coli    See previous culture  [id number]        Blood Gas Venous - Lactate: 1.50 mmol/L (05-24-23 @ 00:47)  Lactate, Blood: 1.7 mmol/L (05-23-23 @ 23:35)      INFECTIOUS DISEASES TESTING  Procalcitonin, Serum: 23.20 (05-25-23 @ 05:28)  Procalcitonin, Serum: 19.00 (05-24-23 @ 05:24)      INFLAMMATORY MARKERS      RADIOLOGY & ADDITIONAL TESTS:  I have personally reviewed the last available Chest xray  CXR      CT  CT Abdomen and Pelvis No Cont:   ACC: [id number] EXAM:  CT ABDOMEN AND PELVIS   ORDERED BY: VIANEY LEMUS     PROCEDURE DATE:  05/24/2023          INTERPRETATION:  CLINICAL STATEMENT: Fever, abdominal pain, neutropenia    TECHNIQUE: Contiguous axial CT images were obtained from the lower chest   to the pubic symphysis without intravenous contrast.  Oral contrast was   not administered.  Reformatted images in the coronal and sagittal planes   were acquired.    COMPARISON: None available.      FINDINGS:    LOWER CHEST: Dependentatelectasis.    HEPATOBILIARY: Unremarkable.    SPLEEN: Unremarkable.    PANCREAS: Fatty atrophy of the head and uncinate process.    ADRENAL GLANDS: Unremarkable.    KIDNEYS: No hydronephrosis. Bilateral renal cysts.    ABDOMINOPELVIC NODES: Unremarkable.    PELVIC ORGANS: Unremarkable.    PERITONEUM/MESENTERY/BOWEL: Multiple right colon-small bowel anastomoses.   No evidence of bowel obstruction or pneumoperitoneum. Trace scattered   free fluid.    BONES/SOFT TISSUES: Multiple ventral hernias containing loops of small   bowel without evidence of obstruction or inflammatory change.   Degenerative changes of the spine. Diffuse osteopenia.    OTHER: Calcific atherosclerosis.      IMPRESSION:  No evidence of acute abdominal pathology.    --- End of Report ---          LOGAN GOMEZ MD; Resident Radiologist  This document has been electronically signed.  DEION TREVIÑO MD; Attending Radiologist  This document has been electronically signed. May 24 2023  1:53AM (05-24-23 @ 01:20)      CARDIOLOGY TESTING  12 Lead ECG:   Ventricular Rate 59 BPM    Atrial Rate 59 BPM    P-R Interval 148 ms    QRS Duration 72 ms    Q-T Interval 484 ms    QTC Calculation(Bazett) 479 ms    P Axis 29 degrees    R Axis 16 degrees    T Axis 12 degrees    Diagnosis Line Sinus bradycardia  Possible Anterior infarct , age undetermined  Abnormal ECG    Confirmed by Melvin Smith ([id number]) on 5/24/2023 8:54:08 AM (05-24-23 @ 07:23)  12 Lead ECG:   Ventricular Rate 100 BPM    Atrial Rate 100 BPM    P-R Interval 136 ms    QRS Duration 74 ms    Q-T Interval 336 ms    QTC Calculation(Bazett) 433 ms    P Axis 60 degrees    R Axis 20 degrees    T Axis 64 degrees    Diagnosis Line Normal sinus rhythm  Nonspecific ST abnormality  Abnormal ECG    Confirmed by KENYATTA QUIROS MD ([id number]) on 5/24/2023 12:49:33 PM (05-23-23 @ 23:20)      MEDICATIONS  amLODIPine   Tablet 10 Oral daily  cefTRIAXone   IVPB 2000 IV Intermittent every 24 hours  chlorhexidine 2% Cloths 1 Topical <User Schedule>  heparin   Injectable 5000 SubCutaneous every 8 hours  pantoprazole    Tablet 40 Oral before breakfast  senna 2 Oral at bedtime  simvastatin 20 Oral at bedtime      WEIGHT  Weight (kg): 70.1 (05-24-23 @ 03:15)  Creatinine, Serum: 1.6 mg/dL (05-26-23 @ 09:03)      ANTIBIOTICS:  cefTRIAXone   IVPB 2000 milliGRAM(s) IV Intermittent every 24 hours      All available historical records have been reviewed      
Patient is a 88y old  Female who presents with a chief complaint of sepsis (25 May 2023 08:09)        Over Night Events:  Vitally stable. Alert and awake    ROS:     All ROS are negative except HPI         PHYSICAL EXAM    ICU Vital Signs Last 24 Hrs  T(C): 37.1 (25 May 2023 07:00), Max: 37.2 (24 May 2023 14:19)  T(F): 98.7 (25 May 2023 07:00), Max: 99 (24 May 2023 14:19)  HR: 82 (25 May 2023 07:00) (64 - 82)  BP: 167/74 (25 May 2023 07:00) (107/53 - 168/76)  BP(mean): 106 (25 May 2023 07:00) (76 - 109)  ABP: --  ABP(mean): --  RR: 18 (25 May 2023 07:00) (18 - 27)  SpO2: 93% (25 May 2023 07:00) (92% - 97%)    O2 Parameters below as of 25 May 2023 07:00  Patient On (Oxygen Delivery Method): room air      ENT: Airway patent,  EYES: Pupils equal, Round and reactive to light.  CARDIAC: Normal rate, Regular rhythm.    RESPIRATORY: No wheezing, Bilateral BS, Not tachypneic, No use of accessory muscles  GASTROINTESTINAL: Abdomen soft, Non-tender, No guarding,   NEUROLOGICAL: Alert and oriented   SKIN: Skin normal color for race, Warm and dry and intact.       23 @ 07:01  -  23 @ 07:00  --------------------------------------------------------  IN:    IV PiggyBack: 50 mL    Oral Fluid: 420 mL    sodium chloride 0.9%: 1100 mL  Total IN: 1570 mL    OUT:    Stool (mL): 3 mL    Voided (mL): 1650 mL  Total OUT: 1653 mL    Total NET: -83 mL        LABS:                            9.2    8.01  )-----------( 110      ( 25 May 2023 05:28 )             29.4                                                   141  |  110  |  29<H>  ----------------------------<  106<H>  4.1   |  20  |  1.6<H>    Ca    7.5<L>      25 May 2023 05:28  Phos  2.0     05-  Mg     2.5     05-25    TPro  5.0<L>  /  Alb  2.9<L>  /  TBili  0.2  /  DBili  x   /  AST  21  /  ALT  16  /  AlkPhos  87  -25      PT/INR - ( 23 May 2023 23:35 )   PT: 10.90 sec;   INR: 0.96 ratio         PTT - ( 23 May 2023 23:35 )  PTT:23.2 sec                                       Urinalysis Basic - ( 24 May 2023 00:00 )    Color: Yellow / Appearance: Clear / S.020 / pH: x  Gluc: x / Ketone: Negative  / Bili: Negative / Urobili: 0.2 mg/dL   Blood: x / Protein: >=300 mg/dL / Nitrite: Negative   Leuk Esterase: Trace / RBC: 1-2 /HPF / WBC >50 /HPF   Sq Epi: x / Non Sq Epi: x / Bacteria: Occasional /HPF        CARDIAC MARKERS ( 25 May 2023 05:28 )  x     / 0.07 ng/mL / x     / x     / 1.3 ng/mL  CARDIAC MARKERS ( 24 May 2023 11:30 )  x     / 0.11 ng/mL / x     / x     / x      CARDIAC MARKERS ( 23 May 2023 23:35 )  x     / 0.09 ng/mL / x     / x     / x                                                LIVER FUNCTIONS - ( 25 May 2023 05:28 )  Alb: 2.9 g/dL / Pro: 5.0 g/dL / ALK PHOS: 87 U/L / ALT: 16 U/L / AST: 21 U/L / GGT: x                                                  Culture - Blood (collected 23 May 2023 23:35)  Source: .Blood Blood-Peripheral  Gram Stain (24 May 2023 22:59):    Growth in aerobic bottle: Gram Negative Rods  Preliminary Report (24 May 2023 23:00):    Growth in aerobic bottle: Gram Negative Rods    ***Blood Panel PCR results on this specimen are available    approximately 3 hours after the Gram stain result.***    Gram stain, PCR, and/or culture results may not always    correspond due to difference in methodologies.    ************************************************************    This PCR assay was performed by multiplex PCR. This    Assay tests for 66 bacterial and resistance gene targets.    Please refer to the [facility] test directory    at [email or web address] for details.  Organism: Blood Culture PCR (25 May 2023 01:27)  Organism: Blood Culture PCR (25 May 2023 01:27)    Culture - Blood (collected 23 May 2023 23:35)  Source: .Blood Blood-Peripheral  Gram Stain (24 May 2023 23:02):    Growth in aerobic and anaerobic bottles: Gram Negative Rods  Preliminary Report (24 May 2023 23:02):    Growth in aerobic and anaerobic bottles: Gram Negative Rods                               MEDICATIONS  (STANDING):  amLODIPine   Tablet 10 milliGRAM(s) Oral daily  cefTRIAXone   IVPB 1000 milliGRAM(s) IV Intermittent every 24 hours  chlorhexidine 2% Cloths 1 Application(s) Topical <User Schedule>  heparin   Injectable 5000 Unit(s) SubCutaneous every 8 hours  pantoprazole    Tablet 40 milliGRAM(s) Oral before breakfast  simvastatin 20 milliGRAM(s) Oral at bedtime  sodium chloride 0.9%. 1000 milliLiter(s) (100 mL/Hr) IV Continuous <Continuous>  sodium phosphate 15 milliMole(s)/250 mL IVPB 15 milliMole(s) IV Intermittent once    MEDICATIONS  (PRN):  acetaminophen     Tablet .. 975 milliGRAM(s) Oral every 6 hours PRN Mild Pain (1 - 3)  
Progress note    INTERVAL HPI/OVERNIGHT EVENTS:    Patient seen and examined at bedside.  phone ID [phone] used. She states she feels overall much better than when she came in. No dysuria noted.      REVIEW OF SYSTEMS:  All other 13 Review of systems were reviewed and are negative    FAMILY HISTORY:    T(C): 36.2 (05-26-23 @ 04:58), Max: 36.3 (05-25-23 @ 15:15)  HR: 72 (05-26-23 @ 04:58) (71 - 74)  BP: 129/70 (05-26-23 @ 04:58) (129/70 - 157/73)  RR: 18 (05-26-23 @ 04:58) (18 - 22)  SpO2: 94% (05-25-23 @ 23:10) (93% - 94%)  Wt(kg): --Vital Signs Last 24 Hrs  T(C): 36.2 (26 May 2023 04:58), Max: 36.3 (25 May 2023 15:15)  T(F): 97.2 (26 May 2023 04:58), Max: 97.4 (25 May 2023 15:15)  HR: 72 (26 May 2023 04:58) (71 - 74)  BP: 129/70 (26 May 2023 04:58) (129/70 - 157/73)  BP(mean): 105 (25 May 2023 23:10) (96 - 105)  RR: 18 (26 May 2023 04:58) (18 - 22)  SpO2: 94% (25 May 2023 23:10) (93% - 94%)    Parameters below as of 25 May 2023 23:10  Patient On (Oxygen Delivery Method): room air      No Known Allergies      PHYSICAL EXAM:  GENERAL: NAD, well-groomed, well-developed  HEAD:  Atraumatic, Normocephalic  EYES: EOMI, PERRLA, conjunctiva and sclera clear  ENMT: No tonsillar erythema, exudates, or enlargement; Moist mucous membranes, Good dentition, No lesions  NECK: Supple, No JVD, Normal thyroid  NERVOUS SYSTEM:  Alert & Oriented X3, Good concentration; Motor Strength 5/5 B/L upper and lower extremities; DTRs 2+ intact and symmetric  CHEST/LUNG: Clear to percussion bilaterally; No rales, rhonchi, wheezing, or rubs  HEART: Regular rate and rhythm; No murmurs, rubs, or gallops  ABDOMEN: Soft, Nontender, Nondistended; Bowel sounds present  EXTREMITIES:  2+ Peripheral Pulses, No clubbing, cyanosis, or edema  LYMPH: No lymphadenopathy noted  SKIN: No rashes or lesions    Consultant(s) Notes Reviewed:  [x ] YES  [ ] NO  Care Discussed with Consultants/Other Providers [ x] YES  [ ] NO    LABS:      RADIOLOGY & ADDITIONAL TESTS:    Imaging Personally Reviewed:  [ ] YES  [ ] NO  acetaminophen     Tablet .. 975 milliGRAM(s) Oral every 6 hours PRN  amLODIPine   Tablet 10 milliGRAM(s) Oral daily  cefTRIAXone   IVPB 2000 milliGRAM(s) IV Intermittent every 24 hours  chlorhexidine 2% Cloths 1 Application(s) Topical <User Schedule>  heparin   Injectable 5000 Unit(s) SubCutaneous every 8 hours  pantoprazole    Tablet 40 milliGRAM(s) Oral before breakfast  senna 2 Tablet(s) Oral at bedtime  simvastatin 20 milliGRAM(s) Oral at bedtime      HEALTH ISSUES - PROBLEM Dx:    89 yo F hx of colon ca sp resection, on oral chemo, HTN, HLD admitted to ICU for transient hypotension and n 3 days of fever     sepsis (POA) - resolved / UTI /  ecoli bacteremia / acute kidney injury / elevated cardiac enzymes / Anemia of chronic dz     - BP now elevated - may resume HTN meds   - DC IV fluids   - continue Rocephin until 6/1. Keep on IV for at least 5 days (day 5 is 5/27) and while hospitalized -- when discharge, can switch to 1st gen cephalosporin (ie Keflex) if susceptible    - follow sensitivities and repeat BC   - procal is elevated follow repeat   - elevated cardiac enzymes possibly secondary to sepsis and LAZARO - will consult cardio   - PT   - DVT prophylaxis    Handoff: IV abx as above. Follow Urine sensitivities and repeat blood cultures.    Total time spent to complete patient's bedside assessment, review medical chart, discuss medical plan of care with covering medical team was ____35____ with > 50% of time spent face to face w/ patient, discussion with patient/family and/or coordination of care
 Patient seen and evaluated this am, eating breakfast,  c/o b/l LE discomfort ([place]  #[id number])      T(F): 98.7 (05-25-23 @ 07:00), Max: 99 (05-24-23 @ 14:19)  HR: 82 (05-25-23 @ 07:00)  BP: 167/74 (05-25-23 @ 07:00)  RR: 18 (05-25-23 @ 07:00)  SpO2: 93% (05-25-23 @ 07:00) (92% - 97%)    PHYSICAL EXAM:  GENERAL: NAD  HEAD:  Atraumatic, Normocephalic  EYES: EOMI, PERRLA, conjunctiva and sclera clear  NERVOUS SYSTEM: , no focal deficits   CHEST/LUNG: Clear to percussion bilaterally; No rales, rhonchi, wheezing, or rubs  HEART: Regular rate and rhythm; No murmurs, rubs, or gallops  ABDOMEN: Soft, Nontender, Nondistended; Bowel sounds present  EXTREMITIES:  2+ Peripheral Pulses, No clubbing, cyanosis, or edema    LABS  05-25    141  |  110  |  29<H>  ----------------------------<  106<H>  4.1   |  20  |  1.6<H>    Ca    7.5<L>      25 May 2023 05:28  Phos  2.0     05-25  Mg     2.5     05-25    TPro  5.0<L>  /  Alb  2.9<L>  /  TBili  0.2  /  DBili  x   /  AST  21  /  ALT  16  /  AlkPhos  87  05-25                          9.2    8.01  )-----------( 110      ( 25 May 2023 05:28 )             29.4     Procalcitonin, Serum (05.24.23 @ 05:24)   Procalcitonin, Serum: 19.00    PT/INR - ( 23 May 2023 23:35 )   PT: 10.90 sec;   INR: 0.96 ratio         PTT - ( 23 May 2023 23:35 )  PTT:23.2 sec    CARDIAC ENZYMES    CKMB Units: 1.3 (05-25 @ 05:28)    Troponin T, Serum: 0.07 ng/mL (05-25-23 @ 05:28)  Troponin T, Serum: 0.11 ng/mL (05-24-23 @ 11:30)  Troponin T, Serum: 0.09 ng/mL (05-23-23 @ 23:35)    < from: 12 Lead ECG (05.24.23 @ 07:23) >    Diagnosis Line Sinus bradycardia  Possible Anterior infarct , age undetermined  Abnormal ECG    < end of copied text >  < from: TTE Echo Complete w/o Contrast w/ Doppler (05.24.23 @ 09:23) >    Summary:   1. LV Ejection Fraction by Mae's Method with a biplane EF of 56 %.   2. Mildly enlarged left atrium.   3. Mild mitral valve regurgitation.   4. Mild-moderate tricuspid regurgitation.   5. Mild aortic regurgitation.   6. Sclerotic aortic valve with normal opening.   7. Estimated pulmonary artery systolic pressure is 45.8 mmHg assuming a   right atrial pressure of 3 mmHg, which is consistent with mild pulmonary   hypertension.    < end of copied text >    Culture Results:   Growth in aerobic bottle: Gram Negative Rods  ***Blood Panel PCR results on this specimen are available  approximately 3 hours after the Gram stain result.***  Gram stain, PCR, and/or culture results may not always  correspond due to difference in methodologies.  ************************************************************  This PCR assay was performed by multiplex PCR. This  Assay tests for 66 bacterial and resistance gene targets.  Please refer to the [facility] test directory  at [email or web address] for details. (05-23-23)  Culture Results:   Growth in aerobic and anaerobic bottles: Gram Negative Rods (05-23-23)    RADIOLOGY  < from: CT Abdomen and Pelvis No Cont (05.24.23 @ 01:20) >  IMPRESSION:  No evidence of acute abdominal pathology.    < end of copied text >  < from: Xray Chest 1 View-PORTABLE IMMEDIATE (05.23.23 @ 23:16) >  Impression:    No radiographic evidence of acute cardiopulmonary disease.      < end of copied text >    MEDICATIONS  (STANDING):  amLODIPine   Tablet 10 milliGRAM(s) Oral daily  cefTRIAXone   IVPB 1000 milliGRAM(s) IV Intermittent every 24 hours  chlorhexidine 2% Cloths 1 Application(s) Topical <User Schedule>  heparin   Injectable 5000 Unit(s) SubCutaneous every 8 hours  pantoprazole    Tablet 40 milliGRAM(s) Oral before breakfast  simvastatin 20 milliGRAM(s) Oral at bedtime  sodium chloride 0.9%. 1000 milliLiter(s) (100 mL/Hr) IV Continuous <Continuous>  sodium phosphate 15 milliMole(s)/250 mL IVPB 15 milliMole(s) IV Intermittent once    MEDICATIONS  (PRN):  acetaminophen     Tablet .. 975 milliGRAM(s) Oral every 6 hours PRN Mild Pain (1 - 3)    
 Patient seen and evaluated this am, comfortable in bed, eating breakfast      T(F): 95.6 (05-24-23 @ 07:05), Max: 104.5 (05-23-23 @ 23:36)  HR: 56 (05-24-23 @ 07:30)  BP: 107/55 (05-24-23 @ 07:00)  RR: 19 (05-24-23 @ 07:30)  SpO2: 96% (05-24-23 @ 07:30) (93% - 99%)    PHYSICAL EXAM:  GENERAL: NAD  HEAD:  Atraumatic, Normocephalic  EYES: EOMI, PERRLA, conjunctiva and sclera clear  NERVOUS SYSTEM: no focal deficits   CHEST/LUNG: Clear to percussion bilaterally; No rales, rhonchi, wheezing, or rubs  HEART: Regular rate and rhythm; No murmurs, rubs, or gallops  ABDOMEN: Soft, Nontender, Nondistended; Bowel sounds present  EXTREMITIES:  2+ Peripheral Pulses, No clubbing, cyanosis, or edema    LABS  05-24    142  |  109  |  38<H>  ----------------------------<  125<H>  3.6   |  22  |  2.3<H>    Ca    7.1<L>      24 May 2023 05:24  Phos  2.3     05-24  Mg     1.5     05-24  Urinalysis (05.24.23 @ 00:00)   pH Urine: 6.5  Glucose Qualitative, Urine: Negative mg/dL  Blood, Urine: Small  Color: Yellow  Urine Appearance: Clear  Bilirubin: Negative  Ketone - Urine: Negative  Specific Gravity: 1.020  Protein, Urine: >=300 mg/dL  Urobilinogen: 0.2 mg/dL  Nitrite: Negative  Leukocyte Esterase Concentration: Trace  TPro  4.9<L>  /  Alb  2.8<L>  /  TBili  0.5  /  DBili  x   /  AST  26  /  ALT  17  /  AlkPhos  57  05-24                          9.7    2.72  )-----------( 104      ( 23 May 2023 23:35 )             30.2     PT/INR - ( 23 May 2023 23:35 )   PT: 10.90 sec;   INR: 0.96 ratio         PTT - ( 23 May 2023 23:35 )  PTT:23.2 sec    CARDIAC ENZYMES      Troponin T, Serum: 0.09 ng/mL (05-23-23 @ 23:35)    < from: 12 Lead ECG (05.24.23 @ 07:23) >  Diagnosis Line Sinus bradycardia    < end of copied text >      RADIOLOGY  < from: CT Abdomen and Pelvis No Cont (05.24.23 @ 01:20) >    IMPRESSION:  No evidence of acute abdominal pathology.    < end of copied text >  < from: Xray Chest 1 View-PORTABLE IMMEDIATE (05.23.23 @ 23:16) >  Impression:    No radiographic evidence of acute cardiopulmonary disease.    < end of copied text >    MEDICATIONS  (STANDING):  cefTRIAXone   IVPB 1000 milliGRAM(s) IV Intermittent every 24 hours  chlorhexidine 2% Cloths 1 Application(s) Topical <User Schedule>  heparin   Injectable 5000 Unit(s) SubCutaneous every 8 hours  magnesium sulfate  IVPB 2 Gram(s) IV Intermittent once  pantoprazole    Tablet 40 milliGRAM(s) Oral before breakfast  sodium chloride 0.9%. 1000 milliLiter(s) (100 mL/Hr) IV Continuous <Continuous>    MEDICATIONS  (PRN):

## 2023-05-26 NOTE — CHART NOTE - NSCHARTNOTEFT_GEN_A_CORE
Went to see patient due to report of wheezing. RN at bedside tells me pulse x is 93% on room air. On my arrival to room patient seen sleeping with unlabored (normal) breathing. Upon awakening, upper respiratory wheezing sound heard but lung fields are clear to ascultation. Since breathing was normal upon my arrival would monitor for now but if any further symtoms develop would consider CXR, other interventions depending on results

## 2023-05-26 NOTE — PROGRESS NOTE ADULT - ASSESSMENT
87 yo F hx of colon ca sp resection, on oral chemo, HTN, HLD admitted to ICU for transient hypotension and n 3 days of fever     sepsis (POA) / suspected UTI / acute kidney injury     - BP now improved s/p IV fluids   - no pressors   - continue Rocephin   - check cultures and procal   - ID consult   - supplement hypomagnesemia   - DVT prophylaxis  
Patient is a 88y old  Female with PMH of Colon cancer, S/P colon resection, HTN, HLD, Status post right knee replacement, now presents to the ER for evaluation of fever, and malaise. On admission, she found to have fever, tachycardia, Hypotension, BP of 86/50  and positive Urine analysis.  She has given 2 liters NS IVF w/o much improvement.  She has admitted in ICU, started on Ceftriaxone and the ID consult requested to assist with further evaluation and antibiotic management.    # Severe sepsis/septic shock  ( Fever + Tachycardia + Hypotension, BP of 86/50 )  - Blood Cx 5/23 E coli     # UTI- WBC >50 /HPF , 5/24    #Colon cancer s/p resection  #s/p right Knee replacement     Recommendations  - continue ceftriaxone 2g daily -- planned end date 6/1 to complete 10 day course  - would keep on IV for at least 5 days (day 5 is 5/27) and while hospitalized -- when discharge, can switch to 1st gen cephalosporin (ie Keflex) if susceptible       Please call or message on Microsoft Teams if with any questions.  Spectra [phone]  
Septic on admission  UTI gram negative rods in blood   Colon cancer s/p resection   Pancytopenia sepsis induced leukopenia resolved   LAZARO  improving     PLAN:    CNS: Avoid sedation     HEENT: Oral care    PULMONARY:  HOB @ 45 degrees.  Aspiration precautions. on room air.      CARDIOVASCULAR: DC IVF. CHEETAH if become hypotensive. Avoid volume overload. Strict I's and O's. ECHO. Trend CE. MAP>65.     GI: PO Feeding. Bowel regimen     RENAL: Follow up lytes. Correct as needed. No hydronephrosis. Urine lytes.   Nephro follow up    INFECTIOUS DISEASE: Follow up blood and urine cultures. C/w Rocephin for UTI patient grewing gram negative sandy in blood    HEMATOLOGICAL:  DVT prophylaxis. Follow up oncologist for pancytopenia, iron studies.    ENDOCRINE:  Follow up FS.  Insulin protocol if needed.    MUSCULOSKELETAL: IAT. PT / rehab    SDU 
IMPRESSION:    Septic on admission  UTI   Bacteremia - E. Coli  Pancytopenia - improving - sepsis induced  Colon cancer s/p resection  LAZARO on CKD    PLAN:    CNS: Avoid sedation     HEENT: Oral care    PULMONARY:  HOB @ 45 degrees.  Aspiration precautions. on room air.      CARDIOVASCULAR: DC IVF. Avoid volume overload. Strict I's and O's. ECHO 56 % EF. Trend CE trended down. MAP>65.     GI: PO Feeding. Bowel regimen     RENAL: Follow up lytes. Correct as needed. No hydronephrosis. Urine lytes.   Nephro follow up    INFECTIOUS DISEASE: Follow up blood and urine cultures. Procal 19.0. C/w Rocephin for UTI    HEMATOLOGICAL:  DVT prophylaxis. LE duplex.     ENDOCRINE:  Follow up FS.  Insulin protocol if needed.    MUSCULOSKELETAL: IAT. PT / rehab    DGTF
87 yo F hx of colon ca sp resection, on oral chemo, HTN, HLD admitted to ICU for transient hypotension and n 3 days of fever     sepsis (POA) - resolved / UTI /  ecoli bacteremia / acute kidney injury / elevated cardiac enzymes     - BP now elevated - may resume HTN meds   - DC IV fluids   - continue Rocephin and follow sensitivities and repeat BC   - procal is elevated follow repeat   - elevated cardiac enzymes possibly secondary to sepsis and LAZARO - will consult cardio   - PT   - DVT prophylaxis  
Patient is a 88y old  Female with PMH of Colon cancer, S/P colon resection, HTN, HLD, Status post right knee replacement, now presents to the ER for evaluation of fever, and malaise. On admission, she found to have fever, tachycardia, Hypotension, BP of 86/50  and positive Urine analysis.  She has given 2 liters NS IVF w/o much improvement.  She has admitted in ICU, started on Ceftriaxone and the ID consult requested to assist with further evaluation and antibiotic management.    # Severe sepsis/septic shock  ( Fever + Tachycardia + Hypotension, BP of 86/50 )  - Blood Cx 5/23 E coli     # UTI- WBC >50 /HPF , 5/24    #Colon cancer s/p resection  #s/p right Knee replacement     Recommendations  - continue ceftriaxone 2g daily   - follow-up urine Cx  - follow-up susceptibilities    Please call or message on Microsoft Teams if with any questions.  Spectra [phone]

## 2023-05-27 VITALS
HEART RATE: 72 BPM | OXYGEN SATURATION: 96 % | TEMPERATURE: 97 F | SYSTOLIC BLOOD PRESSURE: 155 MMHG | RESPIRATION RATE: 18 BRPM | DIASTOLIC BLOOD PRESSURE: 74 MMHG

## 2023-05-27 LAB
-  AMIKACIN: SIGNIFICANT CHANGE UP
-  AMOXICILLIN/CLAVULANIC ACID: SIGNIFICANT CHANGE UP
-  AMPICILLIN/SULBACTAM: SIGNIFICANT CHANGE UP
-  AMPICILLIN: SIGNIFICANT CHANGE UP
-  AZTREONAM: SIGNIFICANT CHANGE UP
-  CEFAZOLIN: SIGNIFICANT CHANGE UP
-  CEFEPIME: SIGNIFICANT CHANGE UP
-  CEFOXITIN: SIGNIFICANT CHANGE UP
-  CEFTRIAXONE: SIGNIFICANT CHANGE UP
-  CEFUROXIME: SIGNIFICANT CHANGE UP
-  CIPROFLOXACIN: SIGNIFICANT CHANGE UP
-  ERTAPENEM: SIGNIFICANT CHANGE UP
-  GENTAMICIN: SIGNIFICANT CHANGE UP
-  IMIPENEM: SIGNIFICANT CHANGE UP
-  LEVOFLOXACIN: SIGNIFICANT CHANGE UP
-  MEROPENEM: SIGNIFICANT CHANGE UP
-  NITROFURANTOIN: SIGNIFICANT CHANGE UP
-  PIPERACILLIN/TAZOBACTAM: SIGNIFICANT CHANGE UP
-  TOBRAMYCIN: SIGNIFICANT CHANGE UP
-  TRIMETHOPRIM/SULFAMETHOXAZOLE: SIGNIFICANT CHANGE UP
ANION GAP SERPL CALC-SCNC: 13 MMOL/L — SIGNIFICANT CHANGE UP (ref 7–14)
BUN SERPL-MCNC: 15 MG/DL — SIGNIFICANT CHANGE UP (ref 10–20)
CALCIUM SERPL-MCNC: 8.4 MG/DL — SIGNIFICANT CHANGE UP (ref 8.4–10.5)
CHLORIDE SERPL-SCNC: 105 MMOL/L — SIGNIFICANT CHANGE UP (ref 98–110)
CO2 SERPL-SCNC: 24 MMOL/L — SIGNIFICANT CHANGE UP (ref 17–32)
CREAT SERPL-MCNC: 1.5 MG/DL — SIGNIFICANT CHANGE UP (ref 0.7–1.5)
CULTURE RESULTS: SIGNIFICANT CHANGE UP
EGFR: 34 ML/MIN/1.73M2 — LOW
GLUCOSE SERPL-MCNC: 93 MG/DL — SIGNIFICANT CHANGE UP (ref 70–99)
HCT VFR BLD CALC: 31.3 % — LOW (ref 37–47)
HGB BLD-MCNC: 10.1 G/DL — LOW (ref 12–16)
MCHC RBC-ENTMCNC: 30.4 PG — SIGNIFICANT CHANGE UP (ref 27–31)
MCHC RBC-ENTMCNC: 32.3 G/DL — SIGNIFICANT CHANGE UP (ref 32–37)
MCV RBC AUTO: 94.3 FL — SIGNIFICANT CHANGE UP (ref 81–99)
METHOD TYPE: SIGNIFICANT CHANGE UP
NRBC # BLD: 0 /100 WBCS — SIGNIFICANT CHANGE UP (ref 0–0)
ORGANISM # SPEC MICROSCOPIC CNT: SIGNIFICANT CHANGE UP
ORGANISM # SPEC MICROSCOPIC CNT: SIGNIFICANT CHANGE UP
PLATELET # BLD AUTO: 149 K/UL — SIGNIFICANT CHANGE UP (ref 130–400)
PMV BLD: 11.3 FL — HIGH (ref 7.4–10.4)
POTASSIUM SERPL-MCNC: 4.1 MMOL/L — SIGNIFICANT CHANGE UP (ref 3.5–5)
POTASSIUM SERPL-SCNC: 4.1 MMOL/L — SIGNIFICANT CHANGE UP (ref 3.5–5)
PROCALCITONIN SERPL-MCNC: 14.3 NG/ML — HIGH (ref 0.02–0.1)
RBC # BLD: 3.32 M/UL — LOW (ref 4.2–5.4)
RBC # FLD: 13 % — SIGNIFICANT CHANGE UP (ref 11.5–14.5)
SODIUM SERPL-SCNC: 142 MMOL/L — SIGNIFICANT CHANGE UP (ref 135–146)
SPECIMEN SOURCE: SIGNIFICANT CHANGE UP
WBC # BLD: 4.86 K/UL — SIGNIFICANT CHANGE UP (ref 4.8–10.8)
WBC # FLD AUTO: 4.86 K/UL — SIGNIFICANT CHANGE UP (ref 4.8–10.8)

## 2023-05-27 PROCEDURE — 99239 HOSP IP/OBS DSCHRG MGMT >30: CPT

## 2023-05-27 RX ADMIN — Medication 975 MILLIGRAM(S): at 05:20

## 2023-05-27 RX ADMIN — AMLODIPINE BESYLATE 10 MILLIGRAM(S): 2.5 TABLET ORAL at 05:19

## 2023-05-27 RX ADMIN — PANTOPRAZOLE SODIUM 40 MILLIGRAM(S): 20 TABLET, DELAYED RELEASE ORAL at 05:19

## 2023-05-27 RX ADMIN — CHLORHEXIDINE GLUCONATE 1 APPLICATION(S): 213 SOLUTION TOPICAL at 05:18

## 2023-05-27 RX ADMIN — HEPARIN SODIUM 5000 UNIT(S): 5000 INJECTION INTRAVENOUS; SUBCUTANEOUS at 05:18

## 2023-05-27 NOTE — DISCHARGE NOTE PROVIDER - NSDCCPCAREPLAN_GEN_ALL_CORE_FT
PRINCIPAL DISCHARGE DIAGNOSIS  Diagnosis: Sepsis secondary to UTI  Assessment and Plan of Treatment: You were diagnosed with a urine infection and a blood infection.  Your antibiotics will cure this.  Please take medication (Keflex) 2 times a day as prescribed until 6/1/23.  Follow with your regular doctor within 1-2 weeks after discharge from the hospital.

## 2023-05-27 NOTE — DISCHARGE NOTE PROVIDER - HOSPITAL COURSE
87 yo F hx of colon ca sp resection, on oral chemo, HTN, HLD admitted to ICU for transient hypotension and n 3 days of fever     sepsis (POA) - resolved / UTI /  ecoli bacteremia / acute kidney injury / elevated cardiac enzymes / Anemia of chronic dz     - Per ID: continue Rocephin until 6/1. Keep on IV for at least 5 days (day 5 is 5/27) and while hospitalized -- when discharge, can switch to 1st gen cephalosporin (ie Keflex) if susceptible    - followed sensitivities and repeat BC-repeat blood Cx NGTD, UCx grew E coli susceptible to Rocephin  -DC on PO Keflex until 6/1   - home PT

## 2023-05-27 NOTE — DISCHARGE NOTE NURSING/CASE MANAGEMENT/SOCIAL WORK - NSDCPEFALRISK_GEN_ALL_CORE
For information on Fall & Injury Prevention, visit: [email or web address] OR  [email or web address] OR  https://www.cdc.gov/steadi/patient.html

## 2023-05-27 NOTE — DISCHARGE NOTE PROVIDER - CARE PROVIDER_API CALL
Chet Trevino  Endocrinology/Metab/Diabetes  [address] [place] [address]  Phone: [phone]  Fax: [phone]  Follow Up Time:

## 2023-05-27 NOTE — DISCHARGE NOTE NURSING/CASE MANAGEMENT/SOCIAL WORK - PATIENT PORTAL LINK FT
You can access the FollowMyHealth Patient Portal offered by [facility] by registering at the following website: [email or web address]. By joining [facility]’s FollowMyHealth portal, you will also be able to view your health information using other applications (apps) compatible with our system.

## 2023-05-27 NOTE — DISCHARGE NOTE PROVIDER - NSDCMRMEDTOKEN_GEN_ALL_CORE_FT
amLODIPine 2.5 mg oral tablet: 1 tab(s) orally once a day  cephalexin 500 mg oral tablet: 1 tab(s) orally every 12 hours  losartan-hydroCHLOROthiazide 100 mg-25 mg oral tablet: 1 tab(s) orally once a day  memantine 14 mg oral capsule, extended release: 1 tab(s) orally once a day  simvastatin 20 mg oral tablet: 1 tab(s) orally once a day (at bedtime)  traZODone 50 mg oral tablet: 1 tab(s) orally once a day

## 2023-05-28 RX ORDER — CEPHALEXIN 500 MG
1 CAPSULE ORAL
Qty: 10 | Refills: 0
Start: 2023-05-28 | End: 2023-06-01

## 2023-05-30 LAB
CULTURE RESULTS: SIGNIFICANT CHANGE UP
SPECIMEN SOURCE: SIGNIFICANT CHANGE UP

## 2023-06-01 DIAGNOSIS — R65.21 SEVERE SEPSIS WITH SEPTIC SHOCK: ICD-10-CM

## 2023-06-01 DIAGNOSIS — I24.8 OTHER FORMS OF ACUTE ISCHEMIC HEART DISEASE: ICD-10-CM

## 2023-06-01 DIAGNOSIS — Z79.899 OTHER LONG TERM (CURRENT) DRUG THERAPY: ICD-10-CM

## 2023-06-01 DIAGNOSIS — N39.0 URINARY TRACT INFECTION, SITE NOT SPECIFIED: ICD-10-CM

## 2023-06-01 DIAGNOSIS — B96.20 UNSPECIFIED ESCHERICHIA COLI [E. COLI] AS THE CAUSE OF DISEASES CLASSIFIED ELSEWHERE: ICD-10-CM

## 2023-06-01 DIAGNOSIS — E83.42 HYPOMAGNESEMIA: ICD-10-CM

## 2023-06-01 DIAGNOSIS — E78.5 HYPERLIPIDEMIA, UNSPECIFIED: ICD-10-CM

## 2023-06-01 DIAGNOSIS — D63.0 ANEMIA IN NEOPLASTIC DISEASE: ICD-10-CM

## 2023-06-01 DIAGNOSIS — N18.9 CHRONIC KIDNEY DISEASE, UNSPECIFIED: ICD-10-CM

## 2023-06-01 DIAGNOSIS — F03.90 UNSPECIFIED DEMENTIA, UNSPECIFIED SEVERITY, WITHOUT BEHAVIORAL DISTURBANCE, PSYCHOTIC DISTURBANCE, MOOD DISTURBANCE, AND ANXIETY: ICD-10-CM

## 2023-06-01 DIAGNOSIS — R06.2 WHEEZING: ICD-10-CM

## 2023-06-01 DIAGNOSIS — Z90.49 ACQUIRED ABSENCE OF OTHER SPECIFIED PARTS OF DIGESTIVE TRACT: ICD-10-CM

## 2023-06-01 DIAGNOSIS — Z96.651 PRESENCE OF RIGHT ARTIFICIAL KNEE JOINT: ICD-10-CM

## 2023-06-01 DIAGNOSIS — R74.8 ABNORMAL LEVELS OF OTHER SERUM ENZYMES: ICD-10-CM

## 2023-06-01 DIAGNOSIS — D63.8 ANEMIA IN OTHER CHRONIC DISEASES CLASSIFIED ELSEWHERE: ICD-10-CM

## 2023-06-01 DIAGNOSIS — D61.818 OTHER PANCYTOPENIA: ICD-10-CM

## 2023-06-01 DIAGNOSIS — A41.51 SEPSIS DUE TO ESCHERICHIA COLI [E. COLI]: ICD-10-CM

## 2023-06-01 DIAGNOSIS — I12.9 HYPERTENSIVE CHRONIC KIDNEY DISEASE WITH STAGE 1 THROUGH STAGE 4 CHRONIC KIDNEY DISEASE, OR UNSPECIFIED CHRONIC KIDNEY DISEASE: ICD-10-CM

## 2023-06-01 DIAGNOSIS — C18.9 MALIGNANT NEOPLASM OF COLON, UNSPECIFIED: ICD-10-CM

## 2023-06-01 DIAGNOSIS — N17.9 ACUTE KIDNEY FAILURE, UNSPECIFIED: ICD-10-CM

## 2023-11-21 NOTE — DISCHARGE NOTE PROVIDER - NSDCHHBASESERVICE_GEN_ALL_CORE
Patient is calling stating if Dr. Stevens can send over prescription lidocaine (LIDODERM) 5 % patch 1 patch to [facility] on   [address], [place] [address]. Patient would like the office to give her a call when the prescription is faxed over, Please advise.    Nursing/Occupational therapy/Physical therapy

## 2024-04-01 NOTE — PATIENT PROFILE ADULT - FUNCTIONAL ASSESSMENT - BASIC MOBILITY SCORE.
Advised pt that we will send over the referral  to [facility]. I also let the pt know that it takes a minute before it is authorized and she advised me to cancel the next appointment and they are going to start over once referral is done.   12

## 2024-12-26 ENCOUNTER — INPATIENT (INPATIENT)
Facility: HOSPITAL | Age: 89
LOS: 1 days | Discharge: HOME | DRG: 690 | End: 2024-12-28
Attending: INTERNAL MEDICINE | Admitting: FAMILY MEDICINE
Payer: MEDICARE

## 2024-12-26 VITALS
SYSTOLIC BLOOD PRESSURE: 143 MMHG | DIASTOLIC BLOOD PRESSURE: 82 MMHG | TEMPERATURE: 99 F | HEART RATE: 72 BPM | RESPIRATION RATE: 18 BRPM | OXYGEN SATURATION: 96 %

## 2024-12-26 DIAGNOSIS — N39.0 URINARY TRACT INFECTION, SITE NOT SPECIFIED: ICD-10-CM

## 2024-12-26 DIAGNOSIS — Z96.651 PRESENCE OF RIGHT ARTIFICIAL KNEE JOINT: Chronic | ICD-10-CM

## 2024-12-26 DIAGNOSIS — Z90.49 ACQUIRED ABSENCE OF OTHER SPECIFIED PARTS OF DIGESTIVE TRACT: Chronic | ICD-10-CM

## 2024-12-26 LAB
ALBUMIN SERPL ELPH-MCNC: 3.6 G/DL — SIGNIFICANT CHANGE UP (ref 3.5–5.2)
ALP SERPL-CCNC: 127 U/L — HIGH (ref 30–115)
ALT FLD-CCNC: 18 U/L — SIGNIFICANT CHANGE UP (ref 0–41)
ANION GAP SERPL CALC-SCNC: 13 MMOL/L — SIGNIFICANT CHANGE UP (ref 7–14)
APPEARANCE UR: ABNORMAL
AST SERPL-CCNC: 28 U/L — SIGNIFICANT CHANGE UP (ref 0–41)
BACTERIA # UR AUTO: ABNORMAL /HPF
BASOPHILS # BLD AUTO: 0.03 K/UL — SIGNIFICANT CHANGE UP (ref 0–0.2)
BASOPHILS NFR BLD AUTO: 0.3 % — SIGNIFICANT CHANGE UP (ref 0–1)
BILIRUB SERPL-MCNC: 0.6 MG/DL — SIGNIFICANT CHANGE UP (ref 0.2–1.2)
BILIRUB UR-MCNC: NEGATIVE — SIGNIFICANT CHANGE UP
BUN SERPL-MCNC: 38 MG/DL — HIGH (ref 10–20)
CALCIUM SERPL-MCNC: 8.6 MG/DL — SIGNIFICANT CHANGE UP (ref 8.4–10.5)
CHLORIDE SERPL-SCNC: 99 MMOL/L — SIGNIFICANT CHANGE UP (ref 98–110)
CO2 SERPL-SCNC: 24 MMOL/L — SIGNIFICANT CHANGE UP (ref 17–32)
COLOR SPEC: YELLOW — SIGNIFICANT CHANGE UP
CREAT SERPL-MCNC: 2 MG/DL — HIGH (ref 0.7–1.5)
DIFF PNL FLD: ABNORMAL
EGFR: 24 ML/MIN/1.73M2 — LOW
EGFR: 24 ML/MIN/1.73M2 — LOW
EOSINOPHIL # BLD AUTO: 0.02 K/UL — SIGNIFICANT CHANGE UP (ref 0–0.7)
EOSINOPHIL NFR BLD AUTO: 0.2 % — SIGNIFICANT CHANGE UP (ref 0–8)
EPI CELLS # UR: PRESENT
FLUAV AG NPH QL: SIGNIFICANT CHANGE UP
FLUBV AG NPH QL: SIGNIFICANT CHANGE UP
GLUCOSE SERPL-MCNC: 109 MG/DL — HIGH (ref 70–99)
GLUCOSE UR QL: NEGATIVE MG/DL — SIGNIFICANT CHANGE UP
HCT VFR BLD CALC: 34.8 % — LOW (ref 37–47)
HGB BLD-MCNC: 11.3 G/DL — LOW (ref 12–16)
IMM GRANULOCYTES NFR BLD AUTO: 0.6 % — HIGH (ref 0.1–0.3)
KETONES UR-MCNC: NEGATIVE MG/DL — SIGNIFICANT CHANGE UP
LEUKOCYTE ESTERASE UR-ACNC: ABNORMAL
LIDOCAIN IGE QN: 29 U/L — SIGNIFICANT CHANGE UP (ref 7–60)
LYMPHOCYTES # BLD AUTO: 0.83 K/UL — LOW (ref 1.2–3.4)
LYMPHOCYTES # BLD AUTO: 9.5 % — LOW (ref 20.5–51.1)
MCHC RBC-ENTMCNC: 29.4 PG — SIGNIFICANT CHANGE UP (ref 27–31)
MCHC RBC-ENTMCNC: 32.5 G/DL — SIGNIFICANT CHANGE UP (ref 32–37)
MCV RBC AUTO: 90.6 FL — SIGNIFICANT CHANGE UP (ref 81–99)
MONOCYTES # BLD AUTO: 1.46 K/UL — HIGH (ref 0.1–0.6)
MONOCYTES NFR BLD AUTO: 16.7 % — HIGH (ref 1.7–9.3)
NEUTROPHILS # BLD AUTO: 6.34 K/UL — SIGNIFICANT CHANGE UP (ref 1.4–6.5)
NEUTROPHILS NFR BLD AUTO: 72.7 % — SIGNIFICANT CHANGE UP (ref 42.2–75.2)
NITRITE UR-MCNC: POSITIVE
NRBC # BLD: 0 /100 WBCS — SIGNIFICANT CHANGE UP (ref 0–0)
NRBC BLD-RTO: 0 /100 WBCS — SIGNIFICANT CHANGE UP (ref 0–0)
PH UR: 6.5 — SIGNIFICANT CHANGE UP (ref 5–8)
PLATELET # BLD AUTO: 179 K/UL — SIGNIFICANT CHANGE UP (ref 130–400)
PMV BLD: 10.7 FL — HIGH (ref 7.4–10.4)
POTASSIUM SERPL-MCNC: 4.6 MMOL/L — SIGNIFICANT CHANGE UP (ref 3.5–5)
POTASSIUM SERPL-SCNC: 4.6 MMOL/L — SIGNIFICANT CHANGE UP (ref 3.5–5)
PROT SERPL-MCNC: 6.3 G/DL — SIGNIFICANT CHANGE UP (ref 6–8)
PROT UR-MCNC: 100 MG/DL
RBC # BLD: 3.84 M/UL — LOW (ref 4.2–5.4)
RBC # FLD: 12.5 % — SIGNIFICANT CHANGE UP (ref 11.5–14.5)
RBC CASTS # UR COMP ASSIST: 20 /HPF — HIGH (ref 0–4)
RSV RNA NPH QL NAA+NON-PROBE: SIGNIFICANT CHANGE UP
SARS-COV-2 RNA SPEC QL NAA+PROBE: SIGNIFICANT CHANGE UP
SODIUM SERPL-SCNC: 136 MMOL/L — SIGNIFICANT CHANGE UP (ref 135–146)
SP GR SPEC: 1.02 — SIGNIFICANT CHANGE UP (ref 1–1.03)
UROBILINOGEN FLD QL: 1 MG/DL — SIGNIFICANT CHANGE UP (ref 0.2–1)
WBC # BLD: 8.73 K/UL — SIGNIFICANT CHANGE UP (ref 4.8–10.8)
WBC # FLD AUTO: 8.73 K/UL — SIGNIFICANT CHANGE UP (ref 4.8–10.8)
WBC UR QL: 70 /HPF — HIGH (ref 0–5)

## 2024-12-26 PROCEDURE — 97162 PT EVAL MOD COMPLEX 30 MIN: CPT | Mod: GP

## 2024-12-26 PROCEDURE — 92610 EVALUATE SWALLOWING FUNCTION: CPT | Mod: GN

## 2024-12-26 PROCEDURE — 80053 COMPREHEN METABOLIC PANEL: CPT

## 2024-12-26 PROCEDURE — 99222 1ST HOSP IP/OBS MODERATE 55: CPT

## 2024-12-26 PROCEDURE — 74176 CT ABD & PELVIS W/O CONTRAST: CPT | Mod: 26,MC

## 2024-12-26 PROCEDURE — 85025 COMPLETE CBC W/AUTO DIFF WBC: CPT

## 2024-12-26 PROCEDURE — 99285 EMERGENCY DEPT VISIT HI MDM: CPT

## 2024-12-26 PROCEDURE — 71045 X-RAY EXAM CHEST 1 VIEW: CPT | Mod: 26

## 2024-12-26 RX ORDER — CEFTRIAXONE 500 MG/1
1000 INJECTION, POWDER, FOR SOLUTION INTRAMUSCULAR; INTRAVENOUS EVERY 24 HOURS
Refills: 0 | Status: DISCONTINUED | OUTPATIENT
Start: 2024-12-26 | End: 2024-12-28

## 2024-12-26 RX ORDER — CEFTRIAXONE 500 MG/1
1000 INJECTION, POWDER, FOR SOLUTION INTRAMUSCULAR; INTRAVENOUS ONCE
Refills: 0 | Status: COMPLETED | OUTPATIENT
Start: 2024-12-26 | End: 2024-12-26

## 2024-12-26 RX ORDER — ATORVASTATIN CALCIUM 80 MG/1
10 TABLET, FILM COATED ORAL AT BEDTIME
Refills: 0 | Status: DISCONTINUED | OUTPATIENT
Start: 2024-12-26 | End: 2024-12-28

## 2024-12-26 RX ORDER — HEPARIN SODIUM 1000 [USP'U]/ML
5000 INJECTION INTRAVENOUS; SUBCUTANEOUS EVERY 12 HOURS
Refills: 0 | Status: DISCONTINUED | OUTPATIENT
Start: 2024-12-26 | End: 2024-12-28

## 2024-12-26 RX ORDER — AMLODIPINE BESYLATE 10 MG/1
5 TABLET ORAL DAILY
Refills: 0 | Status: DISCONTINUED | OUTPATIENT
Start: 2024-12-26 | End: 2024-12-28

## 2024-12-26 RX ORDER — SODIUM CHLORIDE 9 G/1000ML
1000 INJECTION, SOLUTION INTRAVENOUS ONCE
Refills: 0 | Status: COMPLETED | OUTPATIENT
Start: 2024-12-26 | End: 2024-12-26

## 2024-12-26 RX ORDER — ONDANSETRON HCL/PF 4 MG/2 ML
4 VIAL (ML) INJECTION EVERY 6 HOURS
Refills: 0 | Status: DISCONTINUED | OUTPATIENT
Start: 2024-12-26 | End: 2024-12-28

## 2024-12-26 RX ORDER — MEMANTINE HYDROCHLORIDE 21 MG/1
5 CAPSULE, EXTENDED RELEASE ORAL DAILY
Refills: 0 | Status: DISCONTINUED | OUTPATIENT
Start: 2024-12-26 | End: 2024-12-28

## 2024-12-26 RX ORDER — LOSARTAN POTASSIUM 100 MG/1
100 TABLET, FILM COATED ORAL DAILY
Refills: 0 | Status: DISCONTINUED | OUTPATIENT
Start: 2024-12-26 | End: 2024-12-26

## 2024-12-26 RX ORDER — SENNA 187 MG
2 TABLET ORAL AT BEDTIME
Refills: 0 | Status: DISCONTINUED | OUTPATIENT
Start: 2024-12-26 | End: 2024-12-28

## 2024-12-26 RX ORDER — ACETAMINOPHEN 500 MG/5ML
650 LIQUID (ML) ORAL EVERY 6 HOURS
Refills: 0 | Status: DISCONTINUED | OUTPATIENT
Start: 2024-12-26 | End: 2024-12-28

## 2024-12-26 RX ADMIN — ATORVASTATIN CALCIUM 10 MILLIGRAM(S): 80 TABLET, FILM COATED ORAL at 21:31

## 2024-12-26 RX ADMIN — CEFTRIAXONE 100 MILLIGRAM(S): 500 INJECTION, POWDER, FOR SOLUTION INTRAMUSCULAR; INTRAVENOUS at 18:36

## 2024-12-26 RX ADMIN — SODIUM CHLORIDE 1000 MILLILITER(S): 9 INJECTION, SOLUTION INTRAVENOUS at 14:17

## 2024-12-27 PROBLEM — C18.9 MALIGNANT NEOPLASM OF COLON, UNSPECIFIED: Chronic | Status: ACTIVE | Noted: 2023-05-24

## 2024-12-27 PROBLEM — I10 ESSENTIAL (PRIMARY) HYPERTENSION: Chronic | Status: ACTIVE | Noted: 2023-05-24

## 2024-12-27 PROBLEM — E78.5 HYPERLIPIDEMIA, UNSPECIFIED: Chronic | Status: ACTIVE | Noted: 2023-05-24

## 2024-12-27 PROCEDURE — 99232 SBSQ HOSP IP/OBS MODERATE 35: CPT

## 2024-12-27 RX ADMIN — HEPARIN SODIUM 5000 UNIT(S): 1000 INJECTION INTRAVENOUS; SUBCUTANEOUS at 17:00

## 2024-12-27 RX ADMIN — ATORVASTATIN CALCIUM 10 MILLIGRAM(S): 80 TABLET, FILM COATED ORAL at 21:19

## 2024-12-27 RX ADMIN — Medication 650 MILLIGRAM(S): at 19:00

## 2024-12-27 RX ADMIN — CEFTRIAXONE 100 MILLIGRAM(S): 500 INJECTION, POWDER, FOR SOLUTION INTRAMUSCULAR; INTRAVENOUS at 17:00

## 2024-12-27 RX ADMIN — HEPARIN SODIUM 5000 UNIT(S): 1000 INJECTION INTRAVENOUS; SUBCUTANEOUS at 05:36

## 2024-12-27 RX ADMIN — AMLODIPINE BESYLATE 5 MILLIGRAM(S): 10 TABLET ORAL at 05:36

## 2024-12-27 RX ADMIN — Medication 650 MILLIGRAM(S): at 18:58

## 2024-12-27 RX ADMIN — Medication 40 MILLIGRAM(S): at 05:38

## 2024-12-27 RX ADMIN — MEMANTINE HYDROCHLORIDE 5 MILLIGRAM(S): 21 CAPSULE, EXTENDED RELEASE ORAL at 11:03

## 2024-12-28 VITALS
SYSTOLIC BLOOD PRESSURE: 120 MMHG | DIASTOLIC BLOOD PRESSURE: 71 MMHG | HEART RATE: 67 BPM | RESPIRATION RATE: 18 BRPM | TEMPERATURE: 98 F

## 2024-12-28 LAB
ALBUMIN SERPL ELPH-MCNC: 3.2 G/DL — LOW (ref 3.5–5.2)
ALP SERPL-CCNC: 116 U/L — HIGH (ref 30–115)
ALT FLD-CCNC: 12 U/L — SIGNIFICANT CHANGE UP (ref 0–41)
ANION GAP SERPL CALC-SCNC: 17 MMOL/L — HIGH (ref 7–14)
AST SERPL-CCNC: 16 U/L — SIGNIFICANT CHANGE UP (ref 0–41)
BASOPHILS # BLD AUTO: 0.04 K/UL — SIGNIFICANT CHANGE UP (ref 0–0.2)
BASOPHILS NFR BLD AUTO: 0.8 % — SIGNIFICANT CHANGE UP (ref 0–1)
BILIRUB SERPL-MCNC: 0.3 MG/DL — SIGNIFICANT CHANGE UP (ref 0.2–1.2)
BUN SERPL-MCNC: 34 MG/DL — HIGH (ref 10–20)
CALCIUM SERPL-MCNC: 8.3 MG/DL — LOW (ref 8.4–10.5)
CHLORIDE SERPL-SCNC: 105 MMOL/L — SIGNIFICANT CHANGE UP (ref 98–110)
CO2 SERPL-SCNC: 20 MMOL/L — SIGNIFICANT CHANGE UP (ref 17–32)
CREAT SERPL-MCNC: 1.7 MG/DL — HIGH (ref 0.7–1.5)
EGFR: 29 ML/MIN/1.73M2 — LOW
EGFR: 29 ML/MIN/1.73M2 — LOW
EOSINOPHIL # BLD AUTO: 0.1 K/UL — SIGNIFICANT CHANGE UP (ref 0–0.7)
EOSINOPHIL NFR BLD AUTO: 1.9 % — SIGNIFICANT CHANGE UP (ref 0–8)
GLUCOSE SERPL-MCNC: 76 MG/DL — SIGNIFICANT CHANGE UP (ref 70–99)
HCT VFR BLD CALC: 32.3 % — LOW (ref 37–47)
HGB BLD-MCNC: 10.4 G/DL — LOW (ref 12–16)
IMM GRANULOCYTES NFR BLD AUTO: 1 % — HIGH (ref 0.1–0.3)
LYMPHOCYTES # BLD AUTO: 0.94 K/UL — LOW (ref 1.2–3.4)
LYMPHOCYTES # BLD AUTO: 18 % — LOW (ref 20.5–51.1)
MCHC RBC-ENTMCNC: 29.9 PG — SIGNIFICANT CHANGE UP (ref 27–31)
MCHC RBC-ENTMCNC: 32.2 G/DL — SIGNIFICANT CHANGE UP (ref 32–37)
MCV RBC AUTO: 92.8 FL — SIGNIFICANT CHANGE UP (ref 81–99)
MONOCYTES # BLD AUTO: 0.73 K/UL — HIGH (ref 0.1–0.6)
MONOCYTES NFR BLD AUTO: 14 % — HIGH (ref 1.7–9.3)
NEUTROPHILS # BLD AUTO: 3.37 K/UL — SIGNIFICANT CHANGE UP (ref 1.4–6.5)
NEUTROPHILS NFR BLD AUTO: 64.3 % — SIGNIFICANT CHANGE UP (ref 42.2–75.2)
NRBC # BLD: 0 /100 WBCS — SIGNIFICANT CHANGE UP (ref 0–0)
NRBC BLD-RTO: 0 /100 WBCS — SIGNIFICANT CHANGE UP (ref 0–0)
PLATELET # BLD AUTO: 198 K/UL — SIGNIFICANT CHANGE UP (ref 130–400)
PMV BLD: 10.7 FL — HIGH (ref 7.4–10.4)
POTASSIUM SERPL-MCNC: 4.1 MMOL/L — SIGNIFICANT CHANGE UP (ref 3.5–5)
POTASSIUM SERPL-SCNC: 4.1 MMOL/L — SIGNIFICANT CHANGE UP (ref 3.5–5)
PROT SERPL-MCNC: 5.6 G/DL — LOW (ref 6–8)
RBC # BLD: 3.48 M/UL — LOW (ref 4.2–5.4)
RBC # FLD: 12.5 % — SIGNIFICANT CHANGE UP (ref 11.5–14.5)
SODIUM SERPL-SCNC: 142 MMOL/L — SIGNIFICANT CHANGE UP (ref 135–146)
WBC # BLD: 5.23 K/UL — SIGNIFICANT CHANGE UP (ref 4.8–10.8)
WBC # FLD AUTO: 5.23 K/UL — SIGNIFICANT CHANGE UP (ref 4.8–10.8)

## 2024-12-28 PROCEDURE — 99239 HOSP IP/OBS DSCHRG MGMT >30: CPT

## 2024-12-28 RX ADMIN — CEFTRIAXONE 100 MILLIGRAM(S): 500 INJECTION, POWDER, FOR SOLUTION INTRAMUSCULAR; INTRAVENOUS at 13:21

## 2024-12-28 RX ADMIN — Medication 40 MILLIGRAM(S): at 05:03

## 2024-12-28 RX ADMIN — HEPARIN SODIUM 5000 UNIT(S): 1000 INJECTION INTRAVENOUS; SUBCUTANEOUS at 05:04

## 2024-12-28 RX ADMIN — AMLODIPINE BESYLATE 5 MILLIGRAM(S): 10 TABLET ORAL at 05:03

## 2024-12-28 RX ADMIN — MEMANTINE HYDROCHLORIDE 5 MILLIGRAM(S): 21 CAPSULE, EXTENDED RELEASE ORAL at 11:00

## 2025-01-05 DIAGNOSIS — N17.9 ACUTE KIDNEY FAILURE, UNSPECIFIED: ICD-10-CM

## 2025-01-05 DIAGNOSIS — I12.9 HYPERTENSIVE CHRONIC KIDNEY DISEASE WITH STAGE 1 THROUGH STAGE 4 CHRONIC KIDNEY DISEASE, OR UNSPECIFIED CHRONIC KIDNEY DISEASE: ICD-10-CM

## 2025-01-05 DIAGNOSIS — E78.5 HYPERLIPIDEMIA, UNSPECIFIED: ICD-10-CM

## 2025-01-05 DIAGNOSIS — N39.0 URINARY TRACT INFECTION, SITE NOT SPECIFIED: ICD-10-CM

## 2025-01-05 DIAGNOSIS — F03.90 UNSPECIFIED DEMENTIA, UNSPECIFIED SEVERITY, WITHOUT BEHAVIORAL DISTURBANCE, PSYCHOTIC DISTURBANCE, MOOD DISTURBANCE, AND ANXIETY: ICD-10-CM

## 2025-01-05 DIAGNOSIS — N18.9 CHRONIC KIDNEY DISEASE, UNSPECIFIED: ICD-10-CM
